# Patient Record
Sex: FEMALE | Race: WHITE | Employment: STUDENT | URBAN - METROPOLITAN AREA
[De-identification: names, ages, dates, MRNs, and addresses within clinical notes are randomized per-mention and may not be internally consistent; named-entity substitution may affect disease eponyms.]

---

## 2017-02-07 ENCOUNTER — OFFICE VISIT (OUTPATIENT)
Dept: FAMILY MEDICINE | Facility: CLINIC | Age: 22
End: 2017-02-07
Payer: COMMERCIAL

## 2017-02-07 VITALS
DIASTOLIC BLOOD PRESSURE: 70 MMHG | WEIGHT: 177 LBS | BODY MASS INDEX: 26.83 KG/M2 | HEIGHT: 68 IN | RESPIRATION RATE: 14 BRPM | TEMPERATURE: 99.2 F | HEART RATE: 69 BPM | SYSTOLIC BLOOD PRESSURE: 110 MMHG | OXYGEN SATURATION: 100 %

## 2017-02-07 DIAGNOSIS — N92.6 IRREGULAR PERIODS: Primary | ICD-10-CM

## 2017-02-07 DIAGNOSIS — R53.83 FATIGUE, UNSPECIFIED TYPE: ICD-10-CM

## 2017-02-07 LAB
BASOPHILS # BLD AUTO: 0 10E9/L (ref 0–0.2)
BASOPHILS NFR BLD AUTO: 0.6 %
DIFFERENTIAL METHOD BLD: NORMAL
EOSINOPHIL # BLD AUTO: 0.2 10E9/L (ref 0–0.7)
EOSINOPHIL NFR BLD AUTO: 3.3 %
ERYTHROCYTE [DISTWIDTH] IN BLOOD BY AUTOMATED COUNT: 13 % (ref 10–15)
HCT VFR BLD AUTO: 38.6 % (ref 35–47)
HGB BLD-MCNC: 13 G/DL (ref 11.7–15.7)
IRON SATN MFR SERPL: 16 % (ref 15–46)
IRON SERPL-MCNC: 61 UG/DL (ref 35–180)
LYMPHOCYTES # BLD AUTO: 2.5 10E9/L (ref 0.8–5.3)
LYMPHOCYTES NFR BLD AUTO: 48.2 %
MCH RBC QN AUTO: 29.7 PG (ref 26.5–33)
MCHC RBC AUTO-ENTMCNC: 33.7 G/DL (ref 31.5–36.5)
MCV RBC AUTO: 88 FL (ref 78–100)
MONOCYTES # BLD AUTO: 0.5 10E9/L (ref 0–1.3)
MONOCYTES NFR BLD AUTO: 8.9 %
NEUTROPHILS # BLD AUTO: 2 10E9/L (ref 1.6–8.3)
NEUTROPHILS NFR BLD AUTO: 39 %
PLATELET # BLD AUTO: 192 10E9/L (ref 150–450)
RBC # BLD AUTO: 4.38 10E12/L (ref 3.8–5.2)
TIBC SERPL-MCNC: 377 UG/DL (ref 240–430)
TSH SERPL DL<=0.005 MIU/L-ACNC: 3.35 MU/L (ref 0.4–4)
WBC # BLD AUTO: 5.2 10E9/L (ref 4–11)

## 2017-02-07 PROCEDURE — 83540 ASSAY OF IRON: CPT | Performed by: PHYSICIAN ASSISTANT

## 2017-02-07 PROCEDURE — 84443 ASSAY THYROID STIM HORMONE: CPT | Performed by: PHYSICIAN ASSISTANT

## 2017-02-07 PROCEDURE — 85027 COMPLETE CBC AUTOMATED: CPT | Performed by: PHYSICIAN ASSISTANT

## 2017-02-07 PROCEDURE — 36415 COLL VENOUS BLD VENIPUNCTURE: CPT | Performed by: PHYSICIAN ASSISTANT

## 2017-02-07 PROCEDURE — 84270 ASSAY OF SEX HORMONE GLOBUL: CPT | Performed by: PHYSICIAN ASSISTANT

## 2017-02-07 PROCEDURE — 84403 ASSAY OF TOTAL TESTOSTERONE: CPT | Performed by: PHYSICIAN ASSISTANT

## 2017-02-07 PROCEDURE — 85004 AUTOMATED DIFF WBC COUNT: CPT | Performed by: PHYSICIAN ASSISTANT

## 2017-02-07 PROCEDURE — 83550 IRON BINDING TEST: CPT | Performed by: PHYSICIAN ASSISTANT

## 2017-02-07 PROCEDURE — 99214 OFFICE O/P EST MOD 30 MIN: CPT | Performed by: PHYSICIAN ASSISTANT

## 2017-02-07 NOTE — Clinical Note
[facility]  [address]  [address]  [facility] [address]  [phone]                                                                                                           Rima Allred  [address]  [facility] [address]    February 10, 2017      Dear Rima,    The results of your recent tests were reviewed and are enclosed.     - Your lab results look great; everything is normal so unfortunately I don't have a cause for your symptoms.  - Your TSH, a screening test for thyroid disease, was normal.  - Your Blood Count Results show normal White Blood Cell count (no sign of infection), normal Hemoglobin (not anemia), and normal Platelets (affects clotting).  - Your iron levels are normal.  - Your testosterone levels are normal so you likely do not have Polycystic Ovarian Syndrome.  - If you would like to start on birth control to see if that helps your symptoms, let me know.    Results for orders placed or performed in visit on 02/07/17   Iron and iron binding capacity   Result Value Ref Range    Iron 61 35 - 180 ug/dL    Iron Binding Cap 377 240 - 430 ug/dL    Iron Saturation Index 16 15 - 46 %   TSH with free T4 reflex   Result Value Ref Range    TSH 3.35 0.40 - 4.00 mU/L   CBC with platelets   Result Value Ref Range    WBC 5.2 4.0 - 11.0 10e9/L    RBC Count 4.38 3.8 - 5.2 10e12/L    Hemoglobin 13.0 11.7 - 15.7 g/dL    Hematocrit 38.6 35.0 - 47.0 %    MCV 88 78 - 100 fl    MCH 29.7 26.5 - 33.0 pg    MCHC 33.7 31.5 - 36.5 g/dL    RDW 13.0 10.0 - 15.0 %    Platelet Count 192 150 - 450 10e9/L   Testosterone Free and Total   Result Value Ref Range    Testosterone Total 32 8 - 60 ng/dL    Sex Hormone Binding Globulin 60 30 - 135 nmol/L    Free Testosterone Calculated 0.38 0.08 - 0.74 ng/dL   Differential   Result Value Ref Range    Diff Method Automated Method     % Neutrophils 39.0 %    % Lymphocytes 48.2 %    % Monocytes 8.9 %    % Eosinophils 3.3 %     % Basophils 0.6 %    Absolute Neutrophil 2.0 1.6 - 8.3 10e9/L    Absolute Lymphocytes 2.5 0.8 - 5.3 10e9/L    Absolute Monocytes 0.5 0.0 - 1.3 10e9/L    Absolute Eosinophils 0.2 0.0 - 0.7 10e9/L    Absolute Basophils 0.0 0.0 - 0.2 10e9/L     Thank you for choosing [facility].  We appreciate the opportunity to serve you and look forward to supporting your healthcare needs in the future.    If you have any questions or concerns, please call me or my staff at [phone].      Sincerely,        Ragini Cunningham PA-C

## 2017-02-07 NOTE — PROGRESS NOTES
SUBJECTIVE:                                                    Rima Allred is a 21 year old female who presents to clinic today for the following health issues:    Concern - other     Onset: About a year     Description:   Patient is here for a few things (general concerns) she states that she has been having irregular periods, feeling more emotional lately, weight gain, bloated, and fatigue.    Intensity: moderate    Progression of Symptoms:  same    Accompanying Signs & Symptoms:  See above   Previous history of similar problem:   n/a    Precipitating factors:   Worsened by: n/a    Alleviating factors:  Improved by: n/a       Therapies Tried and outcome: n/a  Additional complaints: None    HPI additional notes: Rima presents today with   Chief Complaint   Patient presents with     Other     Abnormal Bleeding Problem   Periods have been between 3-7 weeks apart.  Has been lasting for about 4 days but heavier with more painful cramps.      Feels she sleeps okay at night, usually getting about 8 hours.  Weight gain of about 10 pounds since May.  Watches diet and exercises 6 times weekly.      Will occasionally feel dizzy, denies SOB.  Has been having acne breakouts outside of her period.         Wt Readings from Last 5 Encounters:   02/07/17 177 lb (80.287 kg) (94.16 %*)   05/24/16 168 lb 12.8 oz (76.567 kg) (92.56 %*)   08/21/15 179 lb (81.194 kg) (95.36 %*)   08/06/14 172 lb (78.019 kg) (94.60 %*)   08/01/14 178 lb 12.8 oz (81.103 kg) (95.81 %*)     * Growth percentiles are based on CDC 2-20 Years data.         ROS:  C: Negative for fever or chills. Positive for weight gain unintentional  Skin: POSITIVE for acne.  ENT: Negative for ear, mouth and throat problems  Resp: Negative for significant cough or SOB  CV: Negative for chest pain or peripheral edema  GI: Negative for nausea, abdominal pain, heartburn, or change in bowel habits  MS: POSITIVE for generalized fatigue.  NEURO: POSITIVE for  "dizziness/lightheadedness  ENDOCRINE: POSITIVE  for menstrual irregularity and weight gain  PSYCHIATRIC: POSITIVE for mood changes, irritability. Negative for thoughts of self harm or suicide or depressed mood  ROS otherwise negative.    Chart Review:  History   Smoking status     Never Smoker    Smokeless tobacco     Never Used     Patient Active Problem List   Diagnosis     Warts     History reviewed. No pertinent past surgical history.  Problem list, Medication list, Allergies, Medical/Social/Surg hx reviewed in [facility], updated as appropriate.   OBJECTIVE:                                                    /70 mmHg  Pulse 69  Temp(Src) 99.2  F (37.3  C) (Tympanic)  Resp 14  Ht 5' 8.25\" (1.734 m)  Wt 177 lb (80.287 kg)  BMI 26.70 kg/m2  SpO2 100%  LMP 01/24/2017 (Approximate)  Body mass index is 26.7 kg/(m^2).  GENERAL: healthy, alert, in no acute distress  HENT: Mucous mebranes moist.  NECK: Non-tender, no adenopathy. Thyroid normal to inspection and palpation  RESP: lungs clear to auscultation - no rales, no rhonchi, no wheezes  CV: regular rate and rhythm, normal S1 S2. No peripheral edema.  SKIN: no suspicious lesions, no rashes  PSYCH: Alert and oriented times 3;  Able to articulate logical thoughts. Affect is normal.    Diagnostic test results:  Pending     ASSESSMENT/PLAN:                                                      BMI:   Estimated body mass index is 26.7 kg/(m^2) as calculated from the following:    Height as of this encounter: 5' 8.25\" (1.734 m).    Weight as of this encounter: 177 lb (80.287 kg).   Weight management plan: Pt already exercising regularly and watching her diet.        ICD-10-CM    1. Irregular periods N92.6 Iron and iron binding capacity     CBC with platelets     Testosterone Free and Total   2. Fatigue, unspecified type R53.83 TSH with free T4 reflex     Testosterone Free and Total     Will check labs today to rule out anemia, thyroid disorder and PCOS.  Will call " pt with results.  Discussed may want to start on OCP to see if that helps improve her symptoms at least with irregular periods and mood fluctuations.  Pt does not seem depressed.      Please see patient instructions for treatment details.    Follow up as needed.    Ragini Cunningham PA-C  [facility]

## 2017-02-07 NOTE — PATIENT INSTRUCTIONS
Polycystic Ovary Syndrome  Polycystic ovary syndrome (PCOS) causes harmless, small cysts in the ovaries and other symptoms. PCOS is caused by certain hormones being out of balance. The word  syndrome  means a group of symptoms. Women with PCOS may have no periods, irregular periods, or very long periods.    Your ovaries  Women store their eggs in their ovaries. Each egg is in a capsule called a follicle. Normally during the reproductive years, one follicle grows to produce a mature egg each month. This egg is released during ovulation and the follicle dissolves.  Hormones out of balance  With polycystic ovary syndrome (PCOS), the hormones that control ovulation are out of balance. These include estrogen, progesterones, and androgen. As a result, ovulation may not occur. Instead, the follicle stays enlarged. This is a fluid-filled sac (cyst). Over time, the ovaries fill with many small cysts. This is why they are called  poly  (many)  cystic  ovaries. In some women, the ovaries also make too much androgen.  PCOS symptoms  Women with PCOS may also have one or more of these symptoms:    Trouble getting pregnant (fertility problems)    Weight gain, especially around the waist     Acne    Hair growth on the face and other parts of the body    Patches of thickened, velvety, darkened skin called acanthosis nigricans  Women with PCOS are also at increased risk of developing cancer of the uterine lining, diabetes, and heart disease.     7725-4424 [employer]. [address] [place] [address], [place] [address]. All rights reserved. This information is not intended as a substitute for professional medical care. Always follow your healthcare professional's instructions.

## 2017-02-07 NOTE — MR AVS SNAPSHOT
After Visit Summary   2/7/2017    Rima Allred    MRN: [id number]           Patient Information     Date Of Birth          1995        Visit Information        Provider Department      2/7/2017 9:50 AM Ragini Cunningham PA-C [facility]        Today's Diagnoses     Irregular periods    -  1     Fatigue, unspecified type           Care Instructions      Polycystic Ovary Syndrome  Polycystic ovary syndrome (PCOS) causes harmless, small cysts in the ovaries and other symptoms. PCOS is caused by certain hormones being out of balance. The word  syndrome  means a group of symptoms. Women with PCOS may have no periods, irregular periods, or very long periods.    Your ovaries  Women store their eggs in their ovaries. Each egg is in a capsule called a follicle. Normally during the reproductive years, one follicle grows to produce a mature egg each month. This egg is released during ovulation and the follicle dissolves.  Hormones out of balance  With polycystic ovary syndrome (PCOS), the hormones that control ovulation are out of balance. These include estrogen, progesterones, and androgen. As a result, ovulation may not occur. Instead, the follicle stays enlarged. This is a fluid-filled sac (cyst). Over time, the ovaries fill with many small cysts. This is why they are called  poly  (many)  cystic  ovaries. In some women, the ovaries also make too much androgen.  PCOS symptoms  Women with PCOS may also have one or more of these symptoms:    Trouble getting pregnant (fertility problems)    Weight gain, especially around the waist     Acne    Hair growth on the face and other parts of the body    Patches of thickened, velvety, darkened skin called acanthosis nigricans  Women with PCOS are also at increased risk of developing cancer of the uterine lining, diabetes, and heart disease.     4581-7612 [employer] [address] [place] [address], [place]  "[address]. All rights reserved. This information is not intended as a substitute for professional medical care. Always follow your healthcare professional's instructions.              Follow-ups after your visit        Who to contact     If you have questions or need follow up information about today's clinic visit or your schedule please contact [facility] directly at [phone].  Normal or non-critical lab and imaging results will be communicated to you by MyChart, letter or phone within 4 business days after the clinic has received the results. If you do not hear from us within 7 days, please contact the clinic through [employer] or phone. If you have a critical or abnormal lab result, we will notify you by phone as soon as possible.  Submit refill requests through [employer] or call your pharmacy and they will forward the refill request to us. Please allow 3 business days for your refill to be completed.          Additional Information About Your Visit        [employer] Information     [employer] lets you send messages to your doctor, view your test results, renew your prescriptions, schedule appointments and more. To sign up, go to [email or web address] . Click on \"Log in\" on the left side of the screen, which will take you to the Welcome page. Then click on \"Sign up Now\" on the right side of the page.     You will be asked to enter the access code listed below, as well as some personal information. Please follow the directions to create your username and password.     Your access code is: [email or web address]  Expires: 2017 11:44 AM     Your access code will  in 90 days. If you need help or a new code, please call your [facility] or [phone].        Care EveryWhere ID     This is your Care EveryWhere ID. This could be used by other organizations to access your [place] medical records  [id number]        Your Vitals Were     Pulse Temperature Respirations    69 99.2  F (37.3  C) " "(Tympanic) 14    Height BMI (Body Mass Index) Pulse Oximetry    5' 8.25\" (1.734 m) 26.70 kg/m2 100%    Last Period          01/24/2017 (Approximate)         Blood Pressure from Last 3 Encounters:   02/07/17 110/70   05/24/16 100/70   08/21/15 114/70    Weight from Last 3 Encounters:   02/07/17 177 lb (80.287 kg) (94.16 %*)   05/24/16 168 lb 12.8 oz (76.567 kg) (92.56 %*)   08/21/15 179 lb (81.194 kg) (95.36 %*)     * Growth percentiles are based on CDC 2-20 Years data.              We Performed the Following     CBC with platelets     Iron and iron binding capacity     Testosterone Free and Total     TSH with free T4 reflex          Today's Medication Changes          These changes are accurate as of: 2/7/17 11:44 AM.  If you have any questions, ask your nurse or doctor.               Stop taking these medicines if you haven't already. Please contact your care team if you have questions.     phenazopyridine 100 MG tablet   Commonly known as:  PYRIDIUM   Stopped by:  Ragini Cunningham PA-C           sulfamethoxazole-trimethoprim 800-160 MG per tablet   Commonly known as:  BACTRIM DS/SEPTRA DS   Stopped by:  Ragini Cunningham PA-C                    Primary Care Provider Office Phone # Fax #    Ragini Cunningham PA-C [phone]       [facility] [address] [place] [address]  [facility] [address]        Thank you!     Thank you for choosing [facility]  for your care. Our goal is always to provide you with excellent care. Hearing back from our patients is one way we can continue to improve our services. Please take a few minutes to complete the written survey that you may receive in the mail after your visit with us. Thank you!             Your Updated Medication List - Protect others around you: Learn how to safely use, store and throw away your medicines at www.disposemymeds.org.          This list is accurate as of: 2/7/17 " 11:44 AM.  Always use your most recent med list.                   Brand Name Dispense Instructions for use    IBUPROFEN PO      Take 200 mg by mouth as needed for moderate pain

## 2017-02-07 NOTE — NURSING NOTE
"Chief Complaint   Patient presents with     Other     Abnormal Bleeding Problem       Initial /70 mmHg  Pulse 69  Temp(Src) 99.2  F (37.3  C) (Tympanic)  Resp 14  Ht 5' 8.25\" (1.734 m)  Wt 177 lb (80.287 kg)  BMI 26.70 kg/m2  SpO2 100%  LMP 01/24/2017 (Approximate) Estimated body mass index is 26.7 kg/(m^2) as calculated from the following:    Height as of this encounter: 5' 8.25\" (1.734 m).    Weight as of this encounter: 177 lb (80.287 kg).  Medication Reconciliation: complete    "

## 2017-02-09 LAB
SHBG SERPL-SCNC: 60 NMOL/L (ref 30–135)
TESTOST FREE SERPL-MCNC: 0.38 NG/DL (ref 0.08–0.74)
TESTOST SERPL-MCNC: 32 NG/DL (ref 8–60)

## 2017-06-24 ENCOUNTER — HEALTH MAINTENANCE LETTER (OUTPATIENT)
Age: 22
End: 2017-06-24

## 2017-12-28 ENCOUNTER — OFFICE VISIT (OUTPATIENT)
Dept: FAMILY MEDICINE | Facility: CLINIC | Age: 22
End: 2017-12-28
Payer: COMMERCIAL

## 2017-12-28 VITALS
SYSTOLIC BLOOD PRESSURE: 112 MMHG | RESPIRATION RATE: 14 BRPM | OXYGEN SATURATION: 98 % | BODY MASS INDEX: 25.66 KG/M2 | DIASTOLIC BLOOD PRESSURE: 74 MMHG | HEART RATE: 78 BPM | WEIGHT: 170 LBS | TEMPERATURE: 97 F

## 2017-12-28 DIAGNOSIS — N76.0 VAGINITIS AND VULVOVAGINITIS: Primary | ICD-10-CM

## 2017-12-28 DIAGNOSIS — B37.31 YEAST INFECTION OF THE VAGINA: ICD-10-CM

## 2017-12-28 DIAGNOSIS — L21.9 SEBORRHEIC DERMATITIS: ICD-10-CM

## 2017-12-28 DIAGNOSIS — N94.6 DYSMENORRHEA: ICD-10-CM

## 2017-12-28 LAB
SPECIMEN SOURCE: ABNORMAL
WET PREP SPEC: ABNORMAL

## 2017-12-28 PROCEDURE — 87210 SMEAR WET MOUNT SALINE/INK: CPT | Performed by: PHYSICIAN ASSISTANT

## 2017-12-28 PROCEDURE — 99214 OFFICE O/P EST MOD 30 MIN: CPT | Performed by: PHYSICIAN ASSISTANT

## 2017-12-28 RX ORDER — KETOCONAZOLE 20 MG/ML
SHAMPOO TOPICAL
Qty: 120 ML | Refills: 3 | Status: SHIPPED | OUTPATIENT
Start: 2017-12-28 | End: 2019-11-25

## 2017-12-28 RX ORDER — CLOBETASOL PROPIONATE 0.05 G/100ML
SHAMPOO TOPICAL
Qty: 1 BOTTLE | Refills: 3 | Status: CANCELLED | OUTPATIENT
Start: 2017-12-28

## 2017-12-28 RX ORDER — FLUCONAZOLE 150 MG/1
150 TABLET ORAL ONCE
Qty: 3 TABLET | Refills: 3 | Status: SHIPPED | OUTPATIENT
Start: 2017-12-28 | End: 2017-12-28

## 2017-12-28 RX ORDER — DESOGESTREL AND ETHINYL ESTRADIOL 0.15-0.03
1 KIT ORAL DAILY
Qty: 84 TABLET | Refills: 3 | Status: SHIPPED | OUTPATIENT
Start: 2017-12-28 | End: 2018-11-29

## 2017-12-28 NOTE — PROGRESS NOTES
SUBJECTIVE:   Rima Allred is a 22 year old female who presents to clinic today for the following health issues:    Vaginal Symptoms  Onset: ongoing more than a few weeks    Description:  Vaginal Discharge: curd-like   Itching (Pruritis): no   Burning sensation:  no   Odor: YES    Accompanying Signs & Symptoms:  Pain with Urination: no   Abdominal Pain: no but states that she has been having really bad period cramps  Fever: no     History:   Sexually active: YES  New Partner: no   Possibility of Pregnancy:  No    Precipitating factors:   Recent Antibiotic Use: no     Alleviating factors:  n/a    Therapies Tried and outcome: no      -pt would also like to discuss options for dandruff, and has tried OTC products to help    Reviewed and updated as needed this visit by clinical staff  Tobacco  Allergies  Meds  Problems  Med Hx  Surg Hx  Fam Hx  Soc Hx        Reviewed and updated as needed this visit by Provider  Tobacco  Allergies  Meds  Problems  Med Hx  Surg Hx  Fam Hx  Soc Hx        Additional complaints: Worsening pain with cramps    HPI additional notes: Rima presents today with   Chief Complaint   Patient presents with     Vaginal Problem   Denies hx of liver disease, migraine or thrombophlebitis   Denies fam hx of cancers or thrombophlebitis       ROS:  C: Negative for fever, chills, recent change in weight  Skin: Positive for dandruff   CV: Negative for chest pain or peripheral edema  : POSITIVE for vaginal discharge, vaginal itching/ irritation and dysmenorrhea.   P: Negative for changes in mood or affect  ROS otherwise negative.    Chart Review:  History   Smoking Status     Never Smoker   Smokeless Tobacco     Never Used     Patient Active Problem List   Diagnosis     Warts     Seborrheic dermatitis     Dysmenorrhea     History reviewed. No pertinent surgical history.  Problem list, Medication list, Allergies, Medical/Social/Surg hx reviewed in [facility], updated as appropriate.   OBJECTIVE:                                                     /74  Pulse 78  Temp 97  F (36.1  C) (Tympanic)  Resp 14  Wt 170 lb (77.1 kg)  LMP 12/21/2017  SpO2 98%  BMI 25.66 kg/m2  Body mass index is 25.66 kg/(m^2).  GENERAL: healthy, alert, in no acute distress  HENT: Mucous mebranes moist.  RESP: lungs clear to auscultation - no rales, no rhonchi, no wheezes  CV: regular rate and rhythm, normal S1 S2.  No peripheral edema.  SKIN: white flaking epidermis on scalp, mild erythema.  PSYCH: Alert and oriented times 3;  Able to articulate logical thoughts. Affect is normal.    Diagnostic test results: Wet prep positive for yeast.     ASSESSMENT/PLAN:                                                          ICD-10-CM    1. Vaginitis and vulvovaginitis N76.0 Wet prep   2. Seborrheic dermatitis L21.9 ketoconazole (NIZORAL) 2 % shampoo   3. Yeast infection of the vagina B37.3 fluconazole (DIFLUCAN) 150 MG tablet   4. Dysmenorrhea N94.6 desogestrel-ethinyl estradiol (APRI) 0.15-30 MG-MCG per tablet   Will trial ketoconazole for dandruff.  If not improving, may need to add clobetasol shampoo daily for 2 weeks until symptoms improve.    Will trial apri for dysmenorrhea, should also help somewhat with pt acne.  Discussed IUD but pt does not want to stop having her periods.  Iron IUD not recommended as it would worsen not improve her cramps.    Will give slightly longer prescription for diflucan as pt has had a prolonged yeast infection.    Please see patient instructions for treatment details.    Follow up in 7-10 days if not improving as anticipated.    Ragini Cunningham PA-C  [facility]

## 2017-12-28 NOTE — PATIENT INSTRUCTIONS
Types of Vaginal Infections:  Bacterial vaginosis (BV). BV is due to an imbalance in the normal bacteria in the vagina. Lactobacillus bacteria decrease. As a result, the numbers of bad bacteria increase.  Candidiasis(yeast infection). Yeast is a type of fungus. A yeast infection occurs when yeast cells in the vagina increase. They then attack vaginal tissues. A type of yeast called Candida albicans is often involved.  Trichomoniasis ( trich ). Trich is a parasite. It is passed from one person to another during sex. Men with trich often don t have any symptoms. In women, it can take weeks or months before symptoms appear.  Vaginal Infection: Bacterial Vaginosis  Both good and bad bacteria are present in a healthy vagina. Bacterial vaginosis (BV) occurs when these bacteria get out of balance. The numbers of good bacteria decrease. This allows the numbers of bad bacteria to increase and cause BV. In most cases, BV is not a serious problem. This sheet tells you more about BV and how it can be treated.  Causes of Bacterial Vaginosis  The cause of BV is not clear. Douching may lead to it. Having sex with a new partner or more than one partner makes it more likely.  Symptoms of Bacterial Vaginosis  Symptoms of BV vary for each woman. Some women have few symptoms or none at all. If symptoms are present, they can include:    Thin, milky white or gray discharge    Unpleasant  fishy  odor    Irritation, itching, and burning at opening of vagina    Burning or irritation with sex or urination   Diagnosing Bacterial Vaginosis  Your healthcare provider will ask about your symptoms and health history. He or she will also perform a pelvic exam. This is an exam of your vagina and cervix. A sample of vaginal fluid or discharge may be taken. This sample is checked for signs of BV.  Treating Bacterial Vaginosis  BV is often treated with antibiotics. They may be given in oral pill form or as a vaginal cream. To use these  medications:    Be sure to take all of your medication, even if your symptoms go away.    If you re taking antibiotic pills, do not drink alcohol until you re finished with all of your medication.    If you re using vaginal cream, apply it as directed. Be aware that the cream may make condoms and diaphragms less effective.    Call your healthcare provider if symptoms do not go away within 4 days of starting treatment. Also call if you have a reaction to the medication.   Why Treatment Matters  Even if you have no symptoms or your symptoms are mild, BV should be treated. Untreated BV can lead to health problems such as:    Increased risk of  delivery if you re pregnant.    Increased risk of complications after surgery on the reproductive organs.    Possible increased risk of pelvic inflammatory disease (PID).   Preventing Vaginal Infection  These steps can help you stay comfortable during treatment of a vaginal infection. They also help prevent vaginal infections in the future.  Keeping a Healthy Balance  Factors that change the normal balance in the vagina calead to a vaginal infection. To help keep the balance normal, try these tips:  Change out of wet bathing suits and damp exercise clothes as soon as possible. Yeast thrive in a warm, moist environment.  Avoid wearing tight pants. Choose cotton underwear and pantyhose that have a cotton crotch. Cotton keeps you cooler and drier than synthetics.  Don't douche unless directed by your health care provider. Douching can destroy friendly bacteria and change the vagina's normal balance.  Wipe from front to back after using the toilet. This prevents bacteria from spreading from the anus to the vulva.  Wash the vulva with mild, unscented soap or with plain water.  Wash your diaphragm, spermicide applicators, and sex toys with mild soap and water after use. Dry them thoroughly before putting them away.  Change tampons often (every 2 hours to 4 hours). Leaving a  tampon in for too long may disrupt the balance of vaginal bacteria.  Staying Healthy Overall  Good overall health can help you resist infection. To be healthier:  Help protect yourself from STDs by using latex condoms for intercourse. Ask your health care provider for more information about safer sex.  Eat a variety of healthy foods.  Exercise regularly. Maintain a healthy weight. If you need to lose weight, ask your health care provider for advice on how to start.  Get enough rest and sleep.    How to use your oral contraceptive pills:    Start on Sunday after next normal menstrual period, continue the pills daily through the month and you should have menses on the fourth week.  You should take the pills at around the same time every day, take a missed pill as soon as you remember.  Any missed pills could cause spotting, cramping or pregnancy if sexually active.  While these medications usually make the period lighter and decrease cramping, there is a possibility that you could have heavier or irregular bleeding.  If that is not improving over the first 3 months we can switch to another contraception.  The pill should not be taken if there is a strong family history of blood clots or breast cancer.  You should avoid smoking while on the pill.  Please plan to use condoms to protect against sexually transmitted diseases if you are sexually active.

## 2017-12-28 NOTE — MR AVS SNAPSHOT
After Visit Summary   12/28/2017    Rima Allred    MRN: [id number]           Patient Information     Date Of Birth          1995        Visit Information        Provider Department      12/28/2017 10:50 AM Ragini Cunningham PA-C [facility]        Today's Diagnoses     Vaginitis and vulvovaginitis    -  1    Seborrheic dermatitis        Yeast infection of the vagina        Dysmenorrhea          Care Instructions    Types of Vaginal Infections:  Bacterial vaginosis (BV). BV is due to an imbalance in the normal bacteria in the vagina. Lactobacillus bacteria decrease. As a result, the numbers of bad bacteria increase.  Candidiasis(yeast infection). Yeast is a type of fungus. A yeast infection occurs when yeast cells in the vagina increase. They then attack vaginal tissues. A type of yeast called Candida albicans is often involved.  Trichomoniasis ( trich ). Trich is a parasite. It is passed from one person to another during sex. Men with trich often don t have any symptoms. In women, it can take weeks or months before symptoms appear.  Vaginal Infection: Bacterial Vaginosis  Both good and bad bacteria are present in a healthy vagina. Bacterial vaginosis (BV) occurs when these bacteria get out of balance. The numbers of good bacteria decrease. This allows the numbers of bad bacteria to increase and cause BV. In most cases, BV is not a serious problem. This sheet tells you more about BV and how it can be treated.  Causes of Bacterial Vaginosis  The cause of BV is not clear. Douching may lead to it. Having sex with a new partner or more than one partner makes it more likely.  Symptoms of Bacterial Vaginosis  Symptoms of BV vary for each woman. Some women have few symptoms or none at all. If symptoms are present, they can include:    Thin, milky white or gray discharge    Unpleasant  fishy  odor    Irritation, itching, and burning at opening of  vagina    Burning or irritation with sex or urination   Diagnosing Bacterial Vaginosis  Your healthcare provider will ask about your symptoms and health history. He or she will also perform a pelvic exam. This is an exam of your vagina and cervix. A sample of vaginal fluid or discharge may be taken. This sample is checked for signs of BV.  Treating Bacterial Vaginosis  BV is often treated with antibiotics. They may be given in oral pill form or as a vaginal cream. To use these medications:    Be sure to take all of your medication, even if your symptoms go away.    If you re taking antibiotic pills, do not drink alcohol until you re finished with all of your medication.    If you re using vaginal cream, apply it as directed. Be aware that the cream may make condoms and diaphragms less effective.    Call your healthcare provider if symptoms do not go away within 4 days of starting treatment. Also call if you have a reaction to the medication.   Why Treatment Matters  Even if you have no symptoms or your symptoms are mild, BV should be treated. Untreated BV can lead to health problems such as:    Increased risk of  delivery if you re pregnant.    Increased risk of complications after surgery on the reproductive organs.    Possible increased risk of pelvic inflammatory disease (PID).   Preventing Vaginal Infection  These steps can help you stay comfortable during treatment of a vaginal infection. They also help prevent vaginal infections in the future.  Keeping a Healthy Balance  Factors that change the normal balance in the vagina calead to a vaginal infection. To help keep the balance normal, try these tips:  Change out of wet bathing suits and damp exercise clothes as soon as possible. Yeast thrive in a warm, moist environment.  Avoid wearing tight pants. Choose cotton underwear and pantyhose that have a cotton crotch. Cotton keeps you cooler and drier than synthetics.  Don't douche unless directed by your  health care provider. Douching can destroy friendly bacteria and change the vagina's normal balance.  Wipe from front to back after using the toilet. This prevents bacteria from spreading from the anus to the vulva.  Wash the vulva with mild, unscented soap or with plain water.  Wash your diaphragm, spermicide applicators, and sex toys with mild soap and water after use. Dry them thoroughly before putting them away.  Change tampons often (every 2 hours to 4 hours). Leaving a tampon in for too long may disrupt the balance of vaginal bacteria.  Staying Healthy Overall  Good overall health can help you resist infection. To be healthier:  Help protect yourself from STDs by using latex condoms for intercourse. Ask your health care provider for more information about safer sex.  Eat a variety of healthy foods.  Exercise regularly. Maintain a healthy weight. If you need to lose weight, ask your health care provider for advice on how to start.  Get enough rest and sleep.    How to use your oral contraceptive pills:    Start on Sunday after next normal menstrual period, continue the pills daily through the month and you should have menses on the fourth week.  You should take the pills at around the same time every day, take a missed pill as soon as you remember.  Any missed pills could cause spotting, cramping or pregnancy if sexually active.  While these medications usually make the period lighter and decrease cramping, there is a possibility that you could have heavier or irregular bleeding.  If that is not improving over the first 3 months we can switch to another contraception.  The pill should not be taken if there is a strong family history of blood clots or breast cancer.  You should avoid smoking while on the pill.  Please plan to use condoms to protect against sexually transmitted diseases if you are sexually active.              Follow-ups after your visit        Who to contact     If you have questions or need  "follow up information about today's clinic visit or your schedule please contact [facility] directly at [phone].  Normal or non-critical lab and imaging results will be communicated to you by MyChart, letter or phone within 4 business days after the clinic has received the results. If you do not hear from us within 7 days, please contact the clinic through [facility] or phone. If you have a critical or abnormal lab result, we will notify you by phone as soon as possible.  Submit refill requests through [facility] [employer] or call your pharmacy and they will forward the refill request to us. Please allow 3 business days for your refill to be completed.          Additional Information About Your Visit        [facility] Information     [facility] lets you send messages to your doctor, view your test results, renew your prescriptions, schedule appointments and more. To sign up, go to [email or web address] . Click on \"Log in\" on the left side of the screen, which will take you to the Welcome page. Then click on \"Sign up Now\" on the right side of the page.     You will be asked to enter the access code listed below, as well as some personal information. Please follow the directions to create your username and password.     Your access code is: [email or web address]  Expires: 3/28/2018 11:56 AM     Your access code will  in 90 days. If you need help or a new code, please call your [place] clinic or [phone].        Care EveryWhere ID     This is your Care EveryWhere ID. This could be used by other organizations to access your [place] medical records  [id number]        Your Vitals Were     Pulse Temperature Respirations Last Period Pulse Oximetry BMI (Body Mass Index)    78 97  F (36.1  C) (Tympanic) 14 2017 98% 25.66 kg/m2       Blood Pressure from Last 3 Encounters:   17 112/74   17 110/70   16 100/70    Weight from Last 3 Encounters:   17 170 lb (77.1 kg)   17 177 " lb (80.3 kg) (94 %)*   05/24/16 168 lb 12.8 oz (76.6 kg) (93 %)*     * Growth percentiles are based on CDC 2-20 Years data.              We Performed the Following     Wet prep          Today's Medication Changes          These changes are accurate as of: 12/28/17 11:56 AM.  If you have any questions, ask your nurse or doctor.               Start taking these medicines.        Dose/Directions    desogestrel-ethinyl estradiol 0.15-30 MG-MCG per tablet   Commonly known as:  APRI   Used for:  Dysmenorrhea   Started by:  Ragini Cunningham PA-C        Dose:  1 tablet   Take 1 tablet by mouth daily   Quantity:  84 tablet   Refills:  3       fluconazole 150 MG tablet   Commonly known as:  DIFLUCAN   Used for:  Yeast infection of the vagina   Started by:  Ragini Cunningham PA-C        Dose:  150 mg   Take 1 tablet (150 mg) by mouth once for 1 dose and repeat as needed every three days.   Quantity:  3 tablet   Refills:  3       ketoconazole 2 % shampoo   Commonly known as:  NIZORAL   Used for:  Seborrheic dermatitis   Started by:  Ragini Cunningham PA-C        Apply to damp skin, lather, leave on 5 minutes, and rinse. Use 3-4 times weekly until symptoms improve.   Quantity:  120 mL   Refills:  3            Where to get your medicines      These medications were sent to [facility] [address] [facility] [address] AT [facility] [address]  [address], [facility] [address]    Hours:  24-hours Phone:  [phone]     desogestrel-ethinyl estradiol 0.15-30 MG-MCG per tablet    fluconazole 150 MG tablet    ketoconazole 2 % shampoo                Primary Care Provider Office Phone # Fax #    Ragini Cunningham PA-C [phone] [address]  [facility] [address]        Equal Access to Services     JIMY BEAVER AH: Hadii iqra Meeks, sinanda annie, qaybta kaalmada ulices stevens  laingrid henley. So [facility] [phone].    ATENCIÓN: Si habla nighat, tiene a lema disposición servicios gratuitos de asistencia lingüística. [facility] camp [phone].    We comply with applicable federal civil rights laws and [place] laws. We do not discriminate on the basis of race, color, national origin, age, disability, sex, sexual orientation, or gender identity.            Thank you!     Thank you for choosing [facility]  for your care. Our goal is always to provide you with excellent care. Hearing back from our patients is one way we can continue to improve our services. Please take a few minutes to complete the written survey that you may receive in the mail after your visit with us. Thank you!             Your Updated Medication List - Protect others around you: Learn how to safely use, store and throw away your medicines at www.disposemymeds.org.          This list is accurate as of: 12/28/17 11:56 AM.  Always use your most recent med list.                   Brand Name Dispense Instructions for use Diagnosis    desogestrel-ethinyl estradiol 0.15-30 MG-MCG per tablet    APRI    84 tablet    Take 1 tablet by mouth daily    Dysmenorrhea       fluconazole 150 MG tablet    DIFLUCAN    3 tablet    Take 1 tablet (150 mg) by mouth once for 1 dose and repeat as needed every three days.    Yeast infection of the vagina       IBUPROFEN PO      Take 200 mg by mouth as needed for moderate pain        ketoconazole 2 % shampoo    NIZORAL    120 mL    Apply to damp skin, lather, leave on 5 minutes, and rinse. Use 3-4 times weekly until symptoms improve.    Seborrheic dermatitis

## 2018-06-27 ENCOUNTER — TELEPHONE (OUTPATIENT)
Dept: FAMILY MEDICINE | Facility: CLINIC | Age: 23
End: 2018-06-27

## 2018-06-27 NOTE — TELEPHONE ENCOUNTER
Panel Management Review      Patient has the following on her problem list: None      Composite cancer screening  Chart review shows that this patient is due/due soon for the following Chlamydia screening  Summary:    Patient is due/failing the following:   Chlamydia screening and PHYSICAL    Action needed:   Patient needs office visit for physical.    Type of outreach:    Sent letter.    Questions for provider review:    None

## 2018-06-27 NOTE — LETTER
June 27, 2018    Rima Allred  [address] [facility] [address]    Dear Angel Abarca cares about your health and your health plan.  I have reviewed your medical conditions, medication list and lab results, and am making recommendations based on this review to better manage your health.    You are in particular need of attention regarding:  -Wellness (Physical) Visit   -GC Chlamydia Screening    I am recommending that you:     -schedule a WELLNESS (Physical) APPOINTMENT with me.       -Be tested for Chlamydia      Annual testing is recommended for sexually active women between the ages of 15 and 25.     Chlamydia is the most common bacterial sexually transmitted disease in the [place], according to the Centers for Disease Control (CDC), yet many women considered at risk for the disease do not get the recommended annual screening test.     Chlamydia has no symptoms and left untreated, it can cause infertility and other serious health problems. Chlamydia is easily cured with antibiotics.  We have enclosed a brochure that gives you additional information about Chlamydia.    Testing is now usually done by leaving a urine sample with a lab-only appointment or you can make an office visit if you have other concerns. (If you are not recently or currently sexually active, then please contact us so we can update your medical record.)          Please call us at the [place] location:  [phone] or use [facility] [place] to address the above recommendations.     Thank you for trusting [facility].  We appreciate the opportunity to serve you and look forward to supporting your healthcare in the future.    If you have (or plan to have) any of these tests done at a facility other than a [facility] or a [facility], please have the results sent to the [facility] location noted above.      Best Regards,    JACOB Ellis

## 2018-09-17 ENCOUNTER — OFFICE VISIT (OUTPATIENT)
Dept: FAMILY MEDICINE | Facility: CLINIC | Age: 23
End: 2018-09-17
Payer: COMMERCIAL

## 2018-09-17 VITALS
OXYGEN SATURATION: 100 % | DIASTOLIC BLOOD PRESSURE: 64 MMHG | HEART RATE: 96 BPM | BODY MASS INDEX: 24 KG/M2 | WEIGHT: 159 LBS | SYSTOLIC BLOOD PRESSURE: 122 MMHG | RESPIRATION RATE: 14 BRPM | TEMPERATURE: 98.1 F

## 2018-09-17 DIAGNOSIS — R82.90 NONSPECIFIC FINDING ON EXAMINATION OF URINE: ICD-10-CM

## 2018-09-17 DIAGNOSIS — N30.01 ACUTE CYSTITIS WITH HEMATURIA: Primary | ICD-10-CM

## 2018-09-17 DIAGNOSIS — Z11.3 SCREENING EXAMINATION FOR VENEREAL DISEASE: ICD-10-CM

## 2018-09-17 DIAGNOSIS — R30.0 DYSURIA: ICD-10-CM

## 2018-09-17 LAB
ALBUMIN UR-MCNC: NEGATIVE MG/DL
APPEARANCE UR: ABNORMAL
BACTERIA #/AREA URNS HPF: ABNORMAL /HPF
BILIRUB UR QL STRIP: NEGATIVE
COLOR UR AUTO: YELLOW
GLUCOSE UR STRIP-MCNC: NEGATIVE MG/DL
HGB UR QL STRIP: ABNORMAL
KETONES UR STRIP-MCNC: NEGATIVE MG/DL
LEUKOCYTE ESTERASE UR QL STRIP: ABNORMAL
NITRATE UR QL: NEGATIVE
PH UR STRIP: 7 PH (ref 5–7)
RBC #/AREA URNS AUTO: ABNORMAL /HPF
SOURCE: ABNORMAL
SP GR UR STRIP: 1.01 (ref 1–1.03)
UROBILINOGEN UR STRIP-ACNC: 0.2 EU/DL (ref 0.2–1)
WBC #/AREA URNS AUTO: ABNORMAL /HPF

## 2018-09-17 PROCEDURE — 87186 SC STD MICRODIL/AGAR DIL: CPT | Performed by: PHYSICIAN ASSISTANT

## 2018-09-17 PROCEDURE — 81001 URINALYSIS AUTO W/SCOPE: CPT | Performed by: PHYSICIAN ASSISTANT

## 2018-09-17 PROCEDURE — 87491 CHLMYD TRACH DNA AMP PROBE: CPT | Performed by: PHYSICIAN ASSISTANT

## 2018-09-17 PROCEDURE — 87088 URINE BACTERIA CULTURE: CPT | Performed by: PHYSICIAN ASSISTANT

## 2018-09-17 PROCEDURE — 99213 OFFICE O/P EST LOW 20 MIN: CPT | Performed by: PHYSICIAN ASSISTANT

## 2018-09-17 PROCEDURE — 87086 URINE CULTURE/COLONY COUNT: CPT | Performed by: PHYSICIAN ASSISTANT

## 2018-09-17 RX ORDER — PHENAZOPYRIDINE HYDROCHLORIDE 200 MG/1
200 TABLET, FILM COATED ORAL 3 TIMES DAILY PRN
Qty: 6 TABLET | Refills: 0 | Status: SHIPPED | OUTPATIENT
Start: 2018-09-17 | End: 2018-09-19

## 2018-09-17 RX ORDER — SULFAMETHOXAZOLE/TRIMETHOPRIM 800-160 MG
1 TABLET ORAL 2 TIMES DAILY
Qty: 6 TABLET | Refills: 0 | Status: SHIPPED | OUTPATIENT
Start: 2018-09-17 | End: 2018-09-20

## 2018-09-17 NOTE — PROGRESS NOTES
SUBJECTIVE:   Rima Allred is a 22 year old female who presents to clinic today for the following health issues:    URINARY TRACT SYMPTOMS  Onset: today    Description:   Painful urination (Dysuria): YES- burning  Blood in urine (Hematuria): no   Delay in urine (Hesitency): no     Intensity: mild, moderate    Progression of Symptoms:  worsening    Accompanying Signs & Symptoms:  Fever/chills: no   Flank pain no   Nausea and vomiting: no   Any vaginal symptoms: none  Abdominal/Pelvic Pain: no     History:   History of frequent UTI's: no   History of kidney stones: no   Sexually Active: YES  Possibility of pregnancy: No    Precipitating factors:   n/a    Therapies Tried and outcome: nothing  Reviewed and updated as needed this visit by clinical staff  Tobacco  Allergies  Meds  Problems  Med Hx  Surg Hx  Fam Hx  Soc Hx        Reviewed and updated as needed this visit by Provider  Tobacco  Allergies  Meds  Problems  Med Hx  Surg Hx  Fam Hx  Soc Hx        Additional complaints: None    HPI additional notes: Rima presents today with   Chief Complaint   Patient presents with     UTI          ROS:  C: Negative for fever, chills, recent change in weight  CV: Negative for chest pain or peripheral edema  : POSITIVE for frequency, urgency, and dysuria.  MS: Negative for flank pain  P: Negative for changes in mood or affect  ROS otherwise negative.    Chart Review:  History   Smoking Status     Never Smoker   Smokeless Tobacco     Never Used     No flowsheet data found.  No flowsheet data found.    Patient Active Problem List   Diagnosis     Warts     Seborrheic dermatitis     Dysmenorrhea     History reviewed. No pertinent surgical history.  Problem list, Medication list, Allergies, Medical/Social/Surg hx reviewed in EPIC, updated as appropriate.   OBJECTIVE:                                                    /64  Pulse 96  Temp 98.1  F (36.7  C) (Tympanic)  Resp 14  Wt 159 lb (72.1 kg)  LMP  09/13/2018 (Approximate)  SpO2 100%  BMI 24 kg/m2  Body mass index is 24 kg/(m^2).  GENERAL: healthy, alert, in no acute distress  HENT: Mucous mebranes moist.  MS: no gross deformities noted.  No CVA tenderness.  SKIN: no suspicious lesions, no rashes  PSYCH: Alert and oriented times 3;  Able to articulate logical thoughts. Affect is normal.    Diagnostic test results:   Results for orders placed or performed in visit on 09/17/18 (from the past 24 hour(s))   UA reflex to Microscopic and Culture   Result Value Ref Range    Color Urine Yellow     Appearance Urine Slightly Cloudy     Glucose Urine Negative NEG^Negative mg/dL    Bilirubin Urine Negative NEG^Negative    Ketones Urine Negative NEG^Negative mg/dL    Specific Gravity Urine 1.010 1.003 - 1.035    Blood Urine Large (A) NEG^Negative    pH Urine 7.0 5.0 - 7.0 pH    Protein Albumin Urine Negative NEG^Negative mg/dL    Urobilinogen Urine 0.2 0.2 - 1.0 EU/dL    Nitrite Urine Negative NEG^Negative    Leukocyte Esterase Urine Large (A) NEG^Negative    Source Midstream Urine    Urine Microscopic   Result Value Ref Range    WBC Urine 10-25 (A) OTO5^0 - 5 /HPF    RBC Urine 2-5 (A) OTO2^O - 2 /HPF    Bacteria Urine Few (A) NEG^Negative /HPF        ASSESSMENT/PLAN:                                                          ICD-10-CM    1. Acute cystitis with hematuria N30.01 sulfamethoxazole-trimethoprim (BACTRIM DS) 800-160 MG per tablet     phenazopyridine (PYRIDIUM) 200 MG tablet   2. Dysuria R30.0 UA reflex to Microscopic and Culture     Chlamydia trachomatis PCR     Urine Microscopic   3. Screening examination for venereal disease Z11.3      Will call if urine culture recommends antibiotic change.     Please see patient instructions for treatment details.    Return in about 2 days (around 9/19/2018), or if symptoms worsen or fail to improve.    Ragini Cunningham PA-C  [facility]

## 2018-09-17 NOTE — MR AVS SNAPSHOT
After Visit Summary   9/17/2018    Rima Allred    MRN: [id number]           Patient Information     Date Of Birth          1995        Visit Information        Provider Department      9/17/2018 3:50 PM Ragini Cunningham PA-C [facility]        Today's Diagnoses     Acute cystitis with hematuria    -  1    Dysuria        Screening examination for venereal disease          Care Instructions    1. Start antibiotic today.  2. Increase fluids to help flush urinary tract.    3. Take ibuprofen (600mg every 6 hours with food or water) or tylenol (500mg every 6 hours) for pain.   4. OTC urinary pain reliever Azo/Uristat can be used to prevent discomfort. It changes the color of the urine (usually to orange or red) and may stain fabric. Do not use for longer than 48 hours since symptoms should have cleared by this time once antibiotics have been started.  If you are still having severe symptoms, you need to follow up with the clinic.  5. When culture results return, we will call and change your medication if needed.  Follow up immediately if you start to have high fever, severe back pain, nausea or vomiting.  To prevent future infections:  Drink plenty of water.   Do not delay urinating when you feel the need to urinate.   Keep the vaginal area clean. Wipe from front to back after using the toilet. Be sure to gently wash the genital area each time you bathe or shower. However, use soap only on the outside of your vagina. The chemicals in soap may cause additional irritation.   Urinate after intercourse. Never combine anal and vaginal intercourse.   Wear cotton underwear, which allows better air circulation than nylon. Wear pantyhose with cotton crotches.   Avoid tight clothes in the genital area, such as control-top pantyhose and tight jeans. Do not wear a wet bathing suit for long periods of time.               Follow-ups after your visit        Follow-up  notes from your care team     Return in about 2 days (around 9/19/2018), or if symptoms worsen or fail to improve.      Who to contact     If you have questions or need follow up information about today's clinic visit or your schedule please contact [facility] directly at [phone].  Normal or non-critical lab and imaging results will be communicated to you by MyChart, letter or phone within 4 business days after the clinic has received the results. If you do not hear from us within 7 days, please contact the clinic through MyChart or phone. If you have a critical or abnormal lab result, we will notify you by phone as soon as possible.  Submit refill requests through [employer] or call your pharmacy and they will forward the refill request to us. Please allow 3 business days for your refill to be completed.          Additional Information About Your Visit        Care EveryWhere ID     This is your Care EveryWhere ID. This could be used by other organizations to access your [place] medical records  [id number]        Your Vitals Were     Pulse Temperature Respirations Last Period Pulse Oximetry BMI (Body Mass Index)    96 98.1  F (36.7  C) (Tympanic) 14 09/13/2018 (Approximate) 100% 24 kg/m2       Blood Pressure from Last 3 Encounters:   09/17/18 122/64   12/28/17 112/74   02/07/17 110/70    Weight from Last 3 Encounters:   09/17/18 159 lb (72.1 kg)   12/28/17 170 lb (77.1 kg)   02/07/17 177 lb (80.3 kg) (94 %)*     * Growth percentiles are based on CDC 2-20 Years data.              We Performed the Following     Chlamydia trachomatis PCR     UA reflex to Microscopic and Culture     Urine Microscopic          Today's Medication Changes          These changes are accurate as of 9/17/18  4:13 PM.  If you have any questions, ask your nurse or doctor.               Start taking these medicines.        Dose/Directions    phenazopyridine 200 MG tablet   Commonly known as:  PYRIDIUM   Used  for:  Acute cystitis with hematuria   Started by:  Ragini Cunningham PA-C        Dose:  200 mg   Take 1 tablet (200 mg) by mouth 3 times daily as needed for pain   Quantity:  6 tablet   Refills:  0       sulfamethoxazole-trimethoprim 800-160 MG per tablet   Commonly known as:  BACTRIM DS   Used for:  Acute cystitis with hematuria   Started by:  Ragini Cunningham PA-C        Dose:  1 tablet   Take 1 tablet by mouth 2 times daily for 3 days   Quantity:  6 tablet   Refills:  0            Where to get your medicines      These medications were sent to [facility] [address] AT [facility] [address]  [address], [facility] [address]     Phone:  [phone]     phenazopyridine 200 MG tablet    sulfamethoxazole-trimethoprim 800-160 MG per tablet                Primary Care Provider Office Phone # Fax #    Ragini Cunningham PA-C [phone] [address] [facility] [address]  [facility] [address]        Equal Access to Services     CALIN BEAVER AH: Hadii aad ku hadasho Soomaali, waaxda luqadaha, qaybta kaalmada adeegyada, ulices idiin hayaan adematias flores . So [facility] [phone].    ATENCIÓN: Si habla español, tiene a lema disposición servicios gratuitos de asistencia lingüística. [facility] [phone].    We comply with applicable federal civil rights laws and [place] laws. We do not discriminate on the basis of race, color, national origin, age, disability, sex, sexual orientation, or gender identity.            Thank you!     Thank you for choosing [facility]  for your care. Our goal is always to provide you with excellent care. Hearing back from our patients is one way we can continue to improve our services. Please take a few minutes to complete the written survey that you may receive in the mail after your visit with us. Thank you!             Your Updated Medication List - Protect  others around you: Learn how to safely use, store and throw away your medicines at www.disposemymeds.org.          This list is accurate as of 9/17/18  4:13 PM.  Always use your most recent med list.                   Brand Name Dispense Instructions for use Diagnosis    desogestrel-ethinyl estradiol 0.15-30 MG-MCG per tablet    APRI    84 tablet    Take 1 tablet by mouth daily    Dysmenorrhea       IBUPROFEN PO      Take 200 mg by mouth as needed for moderate pain        ketoconazole 2 % shampoo    NIZORAL    120 mL    Apply to damp skin, lather, leave on 5 minutes, and rinse. Use 3-4 times weekly until symptoms improve.    Seborrheic dermatitis       phenazopyridine 200 MG tablet    PYRIDIUM    6 tablet    Take 1 tablet (200 mg) by mouth 3 times daily as needed for pain    Acute cystitis with hematuria       sulfamethoxazole-trimethoprim 800-160 MG per tablet    BACTRIM DS    6 tablet    Take 1 tablet by mouth 2 times daily for 3 days    Acute cystitis with hematuria

## 2018-09-17 NOTE — LETTER
September 19, 2018      Rima Allred  [address] [place] [id number]        Dear ,    We are writing to inform you of your test results.    -Urine culture is abnormal and grew out bacteria that are sensitive to the antibiotic you have been given.  Complete the medication as prescribed and if you experience new, worsening or persistent symptoms, you should call or return for a recheck.  Result Value Ref Range    Color Urine Yellow     Appearance Urine Slightly Cloudy     Glucose Urine Negative NEG^Negative mg/dL    Bilirubin Urine Negative NEG^Negative    Ketones Urine Negative NEG^Negative mg/dL    Specific Gravity Urine 1.010 1.003 - 1.035    Blood Urine Large (A) NEG^Negative    pH Urine 7.0 5.0 - 7.0 pH    Protein Albumin Urine Negative NEG^Negative mg/dL    Urobilinogen Urine 0.2 0.2 - 1.0 EU/dL    Nitrite Urine Negative NEG^Negative    Leukocyte Esterase Urine Large (A) NEG^Negative    Source Midstream Urine    Urine Microscopic   Result Value Ref Range    WBC Urine 10-25 (A) OTO5^0 - 5 /HPF    RBC Urine 2-5 (A) OTO2^O - 2 /HPF    Bacteria Urine Few (A) NEG^Negative /HPF   Urine Culture Aerobic Bacterial    Culture Micro 10,000 to 50,000 colonies/mL  Escherichia coli  (A)      If you have any questions or concerns, please call the clinic at the number listed above.     Sincerely,    Ragini Cunningham PA-C

## 2018-09-19 LAB
BACTERIA SPEC CULT: ABNORMAL
BACTERIA SPEC CULT: ABNORMAL
C TRACH DNA SPEC QL NAA+PROBE: NEGATIVE
SPECIMEN SOURCE: ABNORMAL
SPECIMEN SOURCE: NORMAL

## 2018-09-19 NOTE — PROGRESS NOTES
Lab letter printed and signed.  Message comments below:  -Urine culture is abnormal and grew out bacteria that are sensitive to the antibiotic you have been given.  Complete the medication as prescribed and if you experience new, worsening or persistent symptoms, you should call or return for a recheck.

## 2018-11-29 ENCOUNTER — OFFICE VISIT (OUTPATIENT)
Dept: FAMILY MEDICINE | Facility: CLINIC | Age: 23
End: 2018-11-29
Payer: COMMERCIAL

## 2018-11-29 VITALS
HEART RATE: 95 BPM | DIASTOLIC BLOOD PRESSURE: 76 MMHG | WEIGHT: 172 LBS | BODY MASS INDEX: 25.96 KG/M2 | SYSTOLIC BLOOD PRESSURE: 110 MMHG | OXYGEN SATURATION: 98 % | TEMPERATURE: 97.7 F | RESPIRATION RATE: 14 BRPM

## 2018-11-29 DIAGNOSIS — Z00.00 ROUTINE GENERAL MEDICAL EXAMINATION AT A HEALTH CARE FACILITY: Primary | ICD-10-CM

## 2018-11-29 PROCEDURE — 99395 PREV VISIT EST AGE 18-39: CPT | Performed by: FAMILY MEDICINE

## 2018-11-29 NOTE — PROGRESS NOTES
"  SUBJECTIVE:   Rima Allred is a 22 year old female who presents to clinic today for the following health issues:      Paper work for studying abroad       Duration: December 29 th 2018 for 6 mo            {additional problems for provider to add:039037}    Problem list and histories reviewed & adjusted, as indicated.  Additional history: {NONE - AS DOCUMENTED:593145::\"as documented\"}    {HIST REVIEW/ LINKS 2:870978}    Reviewed and updated as needed this visit by clinical staff       Reviewed and updated as needed this visit by Provider         {PROVIDER CHARTING PREFERENCE:217189}  "

## 2018-11-29 NOTE — NURSING NOTE
"Chief Complaint   Patient presents with     Physical     /76 (BP Location: Right arm, Patient Position: Sitting, Cuff Size: Adult Small)  Pulse 95  Temp 97.7  F (36.5  C) (Tympanic)  Resp 14  Wt 172 lb (78 kg)  LMP 11/19/2018  SpO2 98%  BMI 25.96 kg/m2 Estimated body mass index is 25.96 kg/(m^2) as calculated from the following:    Height as of 2/7/17: 5' 8.25\" (1.734 m).    Weight as of this encounter: 172 lb (78 kg).  BP completed using cuff size: regular   Ashley Mota CMA    Health Maintenance Due   Topic Date Due     HPV IMMUNIZATION (1 of 3 - Female 3 Dose Series) 12/27/2008     PEDS DTAP/TDAP (2 - Td) 04/21/2011     HIV SCREEN (SYSTEM ASSIGNED)  12/27/2015     Health Maintenance reviewed at today's visit patient asked to schedule/complete:   Immunizations:  Patient agrees to schedule    "

## 2018-11-29 NOTE — MR AVS SNAPSHOT
After Visit Summary   11/29/2018    Rima Allred    MRN: [id number]           Patient Information     Date Of Birth          1995        Visit Information        Provider Department      11/29/2018 4:00 PM Joyce Stock MD [facility]        Care Instructions    1. No difference was  Noted by patients in a double blind study when given codeine, tylenol ( acetaminophen) or ibuprofen (all in identical pills). They felt no difference in pain relief. Since ibuprofen and the NSAIDs  causes kidney damage, esophageal damage with heartburn, and can increase the risk of esophageal and stomach cancer and ulcers,and colonic strictures. They also cause increased risk of heart attack .   I recommend that you use tylenol(acetaminophen) for pain. Use the acetaminophen ES  Which has 500mgm/tablet You can take up to 2 tablets 4 times a day as need for pain.  If this is not enough, you can add in ibuprofen or aleve(naprosyn) with 2 glasses of fluid and some food-to protect the stomach and esophagus. Please let us know if you are continuing to take ibuprofen or aleve, as we will need to periodically check your kidney function with a blood test.    What Kids Should Know About HPV and Cancer     What is HPV?   HPV (Human Papillomavirus) is a very common virus. You get it by kissing or touching another person's genitals (skin-to-skin genital contact). HPV can lead to genital warts and many types of cancer. There is a vaccine to prevent HPV.  Why do I need an HPV vaccine?     Even if you are not sexually active right now, the vaccine will protect you in the future. Protection from the shots will last many, many years and likely your entire lifetime.    HPV is so common that you will likely be exposed to it in your lifetime.    You may not be able to tell if you or someone else has HPV.  When should I get vaccinated?    It takes a series of 3 shots to be fully protected  from HPV. You will need:  ? The first shot as early as age 9 (typically given at age 11 or 12).  ? The second shot about 1 to 2 months after the first shot.  ? The third shot about 6 months after the first shot.    It is important to get all 3 shots before you become sexually active. (You may still get the series after becoming sexually active, but this is less ideal.)    Call your doctor with questions. Your conversations will be confidential.  What will happen when I get the shot?     You may have a sore arm.    You may feel faint. Your doctor may have you wait 15 minutes before leaving.  For more information  For more information on HPV's role in causing cancer or the HPV vaccine, please visit:   http://www.cancer.org.  For informational purposes only. Not to replace the advice of your health care provider. Published 2015 by [facility]. A collaboration between [facility] and [facility]. Image [id number] courtesy of iStock.com/Christiano Dorsey. Smartworks 105303 - 12/15. Text is dedicated to the public domain.      2.  Weight Loss Tips  1. Do not eat after 6 hrs before your expected bedtime  2. Have your heaviest meal for breakfast, a slightly lighter meal at lunch and a snack 6 hrs before bed  3. No sugar/calorie drinks except milk ie no fruit juice, pop, alcohol.  4. Drink milk 30min before meals to decrease your hunger. Also it is excellent as part of your last meal of the day snack  5. Drink lots of water  6. Increase fiber in diet: all bran cereal, salads, popcorn etc  7. Have only one small serving of fruit a day about 1/2 cup (as this is high in sugar)  8. EXERCISE is the bottom line. Without it, you will gain weight even on a low calorie diet. Best if done 2-3X a day as can    Being overweight contributes to high blood pressure and high cholesterol, both of which cause heart attacks, strokes and kidney failure, prediabetes and diabetes, arthritis,  and liver disease     4. T gel at [facility]   Apply and leave on 10 min before wash off     5. Please do your breast exam every mo, when you  Change the  calendar page or set an alarm on your cell phone Do a  visual check for dimples, inversion or indentation or any different position of the nipple Feel manually  for any 1cm or larger  size mass ie about the size of an almond Be sure to cover the entire area of both breasts : this extends back to the back on either side and from the collar bone to the bottom of the breasts where you can begin to feel ribs.  Men are advised to do this exam also as they now have a higher rate of breast cancer , like women do .             Follow-ups after your visit        Who to contact     If you have questions or need follow up information about today's clinic visit or your schedule please contact [facility] directly at [phone].  Normal or non-critical lab and imaging results will be communicated to you by MyChart, letter or phone within 4 business days after the clinic has received the results. If you do not hear from us within 7 days, please contact the clinic through MyChart or phone. If you have a critical or abnormal lab result, we will notify you by phone as soon as possible.  Submit refill requests through [employer] or call your pharmacy and they will forward the refill request to us. Please allow 3 business days for your refill to be completed.          Additional Information About Your Visit        Care EveryWhere ID     This is your Care EveryWhere ID. This could be used by other organizations to access your [facility] medical records  [id number]        Your Vitals Were     Pulse Temperature Respirations Last Period Pulse Oximetry BMI (Body Mass Index)    95 97.7  F (36.5  C) (Tympanic) 14 11/19/2018 98% 25.96 kg/m2       Blood Pressure from Last 3 Encounters:   11/29/18 110/76   09/17/18 122/64   12/28/17 112/74    Weight from Last 3  Encounters:   11/29/18 172 lb (78 kg)   09/17/18 159 lb (72.1 kg)   12/28/17 170 lb (77.1 kg)              Today, you had the following     No orders found for display         Today's Medication Changes          These changes are accurate as of 11/29/18  4:39 PM.  If you have any questions, ask your nurse or doctor.               Stop taking these medicines if you haven't already. Please contact your care team if you have questions.     desogestrel-ethinyl estradiol 0.15-30 MG-MCG tablet   Commonly known as:  APRI   Stopped by:  Joyce Stock MD                    Primary Care Provider Office Phone # Fax #    Ragini Cunningham PA-C [phone] [address] [place] [address] [address]        Equal Access to Services     [facility] : Hadii iqra kaye hadasho Sobeth, waaxda luqadaha, qaybta kaalmada adeegyada, ulices flores . So [facility] [phone].    ATENCIÓN: Si habla español, tiene a lema disposición servicios gratuitos de asistencia lingüística. Matilde al [phone].    We comply with applicable federal civil rights laws and [place] laws. We do not discriminate on the basis of race, color, national origin, age, disability, sex, sexual orientation, or gender identity.            Thank you!     Thank you for choosing [facility]  for your care. Our goal is always to provide you with excellent care. Hearing back from our patients is one way we can continue to improve our services. Please take a few minutes to complete the written survey that you may receive in the mail after your visit with us. Thank you!             Your Updated Medication List - Protect others around you: Learn how to safely use, store and throw away your medicines at www.disposemymeds.org.          This list is accurate as of 11/29/18  4:39 PM.  Always use your most recent med list.                   Brand Name Dispense Instructions for use  Diagnosis    IBUPROFEN PO      Take 200 mg by mouth as needed for moderate pain        ketoconazole 2 % external shampoo    NIZORAL    120 mL    Apply to damp skin, lather, leave on 5 minutes, and rinse. Use 3-4 times weekly until symptoms improve.    Seborrheic dermatitis

## 2018-11-29 NOTE — PATIENT INSTRUCTIONS
1. No difference was  Noted by patients in a double blind study when given codeine, tylenol ( acetaminophen) or ibuprofen (all in identical pills). They felt no difference in pain relief. Since ibuprofen and the NSAIDs  causes kidney damage, esophageal damage with heartburn, and can increase the risk of esophageal and stomach cancer and ulcers,and colonic strictures. They also cause increased risk of heart attack .   I recommend that you use tylenol(acetaminophen) for pain. Use the acetaminophen ES  Which has 500mgm/tablet You can take up to 2 tablets 4 times a day as need for pain.  If this is not enough, you can add in ibuprofen or aleve(naprosyn) with 2 glasses of fluid and some food-to protect the stomach and esophagus. Please let us know if you are continuing to take ibuprofen or aleve, as we will need to periodically check your kidney function with a blood test.    What Kids Should Know About HPV and Cancer     What is HPV?   HPV (Human Papillomavirus) is a very common virus. You get it by kissing or touching another person's genitals (skin-to-skin genital contact). HPV can lead to genital warts and many types of cancer. There is a vaccine to prevent HPV.  Why do I need an HPV vaccine?     Even if you are not sexually active right now, the vaccine will protect you in the future. Protection from the shots will last many, many years and likely your entire lifetime.    HPV is so common that you will likely be exposed to it in your lifetime.    You may not be able to tell if you or someone else has HPV.  When should I get vaccinated?    It takes a series of 3 shots to be fully protected from HPV. You will need:  ? The first shot as early as age 9 (typically given at age 11 or 12).  ? The second shot about 1 to 2 months after the first shot.  ? The third shot about 6 months after the first shot.    It is important to get all 3 shots before you become sexually active. (You may still get the series after becoming  sexually active, but this is less ideal.)    Call your doctor with questions. Your conversations will be confidential.  What will happen when I get the shot?     You may have a sore arm.    You may feel faint. Your doctor may have you wait 15 minutes before leaving.  For more information  For more information on HPV's role in causing cancer or the HPV vaccine, please visit:   http://www.cancer.org.  For informational purposes only. Not to replace the advice of your health care provider. Published 2015 by [facility]. A collaboration between [facility] and [facility]. Image [id number] courtesy of iStock.com/Christiano Dorsey. Smartworks 474100 - 12/15. Text is dedicated to the public domain.      2.  Weight Loss Tips  1. Do not eat after 6 hrs before your expected bedtime  2. Have your heaviest meal for breakfast, a slightly lighter meal at lunch and a snack 6 hrs before bed  3. No sugar/calorie drinks except milk ie no fruit juice, pop, alcohol.  4. Drink milk 30min before meals to decrease your hunger. Also it is excellent as part of your last meal of the day snack  5. Drink lots of water  6. Increase fiber in diet: all bran cereal, salads, popcorn etc  7. Have only one small serving of fruit a day about 1/2 cup (as this is high in sugar)  8. EXERCISE is the bottom line. Without it, you will gain weight even on a low calorie diet. Best if done 2-3X a day as can    Being overweight contributes to high blood pressure and high cholesterol, both of which cause heart attacks, strokes and kidney failure, prediabetes and diabetes, arthritis, and liver disease     4. T gel at [facility]   Apply and leave on 10 min before wash off     5. Please do your breast exam every mo, when you  Change the  calendar page or set an alarm on your cell phone Do a  visual check for dimples, inversion or indentation or any different position of the nipple Feel manually  for any 1cm or  larger  size mass ie about the size of an almond Be sure to cover the entire area of both breasts : this extends back to the back on either side and from the collar bone to the bottom of the breasts where you can begin to feel ribs.  Men are advised to do this exam also as they now have a higher rate of breast cancer , like women do .       Preventive Health Recommendations  Female Ages 18 to 20     Yearly exam:     See your health care provider every year in order to  o Review health changes.   o Discuss preventive care.    o Review your medicines if your doctor has prescribed any.      You should be tested each year for STDs (sexually transmitted diseases).       After age 20, talk to your provider about how often you should have cholesterol testing.      If you are at risk for diabetes, you should have a diabetes test (fasting glucose).     Shots:     Get a flu shot each year.     Get a tetanus shot every 10 years.     Consider getting the shot (vaccine) that prevents cervical cancer (Gardasil).    Nutrition:     Eat at least 5 servings of fruits and vegetables each day.    Eat whole-grain bread, whole-wheat pasta and brown rice instead of white grains and rice.    Get adequate Calcium and Vitamin D.     Lifestyle    Exercise at least 150 minutes a week each week (30 minutes a day, 5 days a week). This will help you control your weight and prevent disease.    No smoking.     Wear sunscreen to prevent skin cancer.    See your dentist every six months for an exam and cleaning.

## 2018-11-29 NOTE — PROGRESS NOTES
SUBJECTIVE:   CC: Rima Allred is an 22 year old woman who presents for preventive health visit.     Healthy Habits:    Do you get at least three servings of calcium containing foods daily (dairy, green leafy vegetables, etc.)? yes    Amount of exercise or daily activities, outside of work: 3-4 day(s) per week    Problems taking medications regularly No    Medication side effects: No    Have you had an eye exam in the past two years? yes    Do you see a dentist twice per year? yes    Do you have sleep apnea, excessive snoring or daytime drowsiness?no          Today's PHQ-2 Score:   PHQ-2 ( 1999 [employer]) 11/29/2018 9/17/2018   Q1: Little interest or pleasure in doing things 0 0   Q2: Feeling down, depressed or hopeless 0 0   PHQ-2 Score 0 0       Abuse: Current or Past(Physical, Sexual or Emotional)- No  Do you feel safe in your environment? Yes    Social History   Substance Use Topics     Smoking status: Never Smoker     Smokeless tobacco: Never Used     Alcohol use No     If you drink alcohol do you typically have >3 drinks per day or >7 drinks per week? Yes - AUDIT SCORE:     No flowsheet data found.                     Reviewed orders with patient.  Reviewed health maintenance and updated orders accordingly - Yes  Labs reviewed in [facility]    Mammogram not appropriate for this patient based on age.    Pertinent mammograms are reviewed under the imaging tab.  History of abnormal Pap smear: NO - under age 21, PAP not appropriate for age     Reviewed and updated as needed this visit by clinical staff  Tobacco  Allergies  Meds  Problems  Med Hx  Surg Hx  Fam Hx  Soc Hx          Reviewed and updated as needed this visit by Provider  Allergies  Meds  Problems            ROS:  CONSTITUTIONAL: NEGATIVE for fever, chills, change in weight  INTEGUMENTARU/SKIN: NEGATIVE for worrisome rashes, moles or lesions  EYES: NEGATIVE for vision changes or irritation  ENT: NEGATIVE for ear, mouth and throat problems  RESP:  NEGATIVE for significant cough or SOB  BREAST: NEGATIVE for masses, tenderness or discharge  CV: NEGATIVE for chest pain, palpitations or peripheral edema  GI: NEGATIVE for nausea, abdominal pain, heartburn, or change in bowel habits  : NEGATIVE for unusual urinary or vaginal symptoms. Periods are regular.  MUSCULOSKELETAL: NEGATIVE for significant arthralgias or myalgia  NEURO: NEGATIVE for weakness, dizziness or paresthesias  PSYCHIATRIC: NEGATIVE for changes in mood or affect    OBJECTIVE:   /76 (BP Location: Right arm, Patient Position: Sitting, Cuff Size: Adult Small)  Pulse 95  Temp 97.7  F (36.5  C) (Tympanic)  Resp 14  Wt 172 lb (78 kg)  LMP 11/19/2018  SpO2 98%  BMI 25.96 kg/m2  EXAM:  GENERAL: healthy, alert and no distress  EYES: Eyes grossly normal to inspection, PERRL and conjunctivae and sclerae normal  RESP: lungs clear to auscultation - no rales, rhonchi or wheezes  CV: regular rate and rhythm, normal S1 S2, no S3 or S4, no murmur, click or rub, no peripheral edema and peripheral pulses strong  MS: no gross musculoskeletal defects noted, no edema  SKIN: no suspicious lesions or rashes  NEURO: Normal strength and tone, mentation intact and speech normal  PSYCH: mentation appears normal, affect normal/bright    Diagnostic Test Results:  Results for orders placed or performed in visit on 09/17/18   UA reflex to Microscopic and Culture   Result Value Ref Range    Color Urine Yellow     Appearance Urine Slightly Cloudy     Glucose Urine Negative NEG^Negative mg/dL    Bilirubin Urine Negative NEG^Negative    Ketones Urine Negative NEG^Negative mg/dL    Specific Gravity Urine 1.010 1.003 - 1.035    Blood Urine Large (A) NEG^Negative    pH Urine 7.0 5.0 - 7.0 pH    Protein Albumin Urine Negative NEG^Negative mg/dL    Urobilinogen Urine 0.2 0.2 - 1.0 EU/dL    Nitrite Urine Negative NEG^Negative    Leukocyte Esterase Urine Large (A) NEG^Negative    Source Midstream Urine    Urine Microscopic  "  Result Value Ref Range    WBC Urine 10-25 (A) OTO5^0 - 5 /HPF    RBC Urine 2-5 (A) OTO2^O - 2 /HPF    Bacteria Urine Few (A) NEG^Negative /HPF   Chlamydia trachomatis PCR   Result Value Ref Range    Specimen Description Urine     Chlamydia Trachomatis PCR Negative NEG^Negative   Urine Culture Aerobic Bacterial   Result Value Ref Range    Specimen Description Midstream Urine     Culture Micro 10,000 to 50,000 colonies/mL  Escherichia coli   (A)     Culture Micro       <10,000 colonies/mL  mixed urogenital zari  Susceptibility testing not routinely done         Susceptibility    Escherichia coli - ERNIE     AMPICILLIN 4 Sensitive ug/mL     CEFAZOLIN* <=4 Sensitive ug/mL      * Cefazolin ERNIE breakpoints are for the treatment of uncomplicated urinary tract infections.  For the treatment of systemic infections, please contact the laboratory for additional testing.     CEFOXITIN <=4 Sensitive ug/mL     CEFTAZIDIME <=1 Sensitive ug/mL     CEFTRIAXONE <=1 Sensitive ug/mL     CIPROFLOXACIN <=0.25 Sensitive ug/mL     GENTAMICIN <=1 Sensitive ug/mL     LEVOFLOXACIN <=0.12 Sensitive ug/mL     NITROFURANTOIN <=16 Sensitive ug/mL     TOBRAMYCIN <=1 Sensitive ug/mL     Trimethoprim/Sulfa <=1/19 Sensitive ug/mL     AMPICILLIN/SULBACTAM <=2 Sensitive ug/mL     Piperacillin/Tazo <=4 Sensitive ug/mL     CEFEPIME <=1 Sensitive ug/mL       ASSESSMENT/PLAN:       ICD-10-CM    1. Routine general medical examination at a health care facility for 6 mo study in [place]  Z00.00        COUNSELING:   Reviewed preventive health counseling, as reflected in patient instructions       Regular exercise       Healthy diet/nutrition    BP Readings from Last 1 Encounters:   11/29/18 110/76     Estimated body mass index is 25.96 kg/(m^2) as calculated from the following:    Height as of 2/7/17: 5' 8.25\" (1.734 m).    Weight as of this encounter: 172 lb (78 kg).                       reports that she has never smoked. She has never used smokeless " tobacco.      Counseling Resources:  ATP IV Guidelines  Pooled Cohorts Equation Calculator  Breast Cancer Risk Calculator  FRAX Risk Assessment  ICSI Preventive Guidelines  Dietary Guidelines for Americans, 2010  USDA's MyPlate  ASA Prophylaxis  Lung CA Screening    Joyce Stock MD  [facility]

## 2018-12-14 ENCOUNTER — OFFICE VISIT (OUTPATIENT)
Dept: FAMILY MEDICINE | Facility: CLINIC | Age: 23
End: 2018-12-14
Payer: COMMERCIAL

## 2018-12-14 VITALS
DIASTOLIC BLOOD PRESSURE: 70 MMHG | TEMPERATURE: 98 F | SYSTOLIC BLOOD PRESSURE: 110 MMHG | WEIGHT: 164 LBS | HEART RATE: 77 BPM | RESPIRATION RATE: 16 BRPM | BODY MASS INDEX: 24.75 KG/M2

## 2018-12-14 DIAGNOSIS — R10.13 ABDOMINAL PAIN, EPIGASTRIC: Primary | ICD-10-CM

## 2018-12-14 DIAGNOSIS — K59.00 CONSTIPATION, UNSPECIFIED CONSTIPATION TYPE: ICD-10-CM

## 2018-12-14 LAB
ERYTHROCYTE [DISTWIDTH] IN BLOOD BY AUTOMATED COUNT: 12.2 % (ref 10–15)
ERYTHROCYTE [SEDIMENTATION RATE] IN BLOOD BY WESTERGREN METHOD: 7 MM/H (ref 0–20)
HCT VFR BLD AUTO: 36.9 % (ref 35–47)
HGB BLD-MCNC: 13 G/DL (ref 11.7–15.7)
MCH RBC QN AUTO: 30.5 PG (ref 26.5–33)
MCHC RBC AUTO-ENTMCNC: 35.2 G/DL (ref 31.5–36.5)
MCV RBC AUTO: 87 FL (ref 78–100)
PLATELET # BLD AUTO: 222 10E9/L (ref 150–450)
RBC # BLD AUTO: 4.26 10E12/L (ref 3.8–5.2)
WBC # BLD AUTO: 6.2 10E9/L (ref 4–11)

## 2018-12-14 PROCEDURE — 85652 RBC SED RATE AUTOMATED: CPT | Performed by: PHYSICIAN ASSISTANT

## 2018-12-14 PROCEDURE — 83516 IMMUNOASSAY NONANTIBODY: CPT | Mod: 59 | Performed by: PHYSICIAN ASSISTANT

## 2018-12-14 PROCEDURE — 83516 IMMUNOASSAY NONANTIBODY: CPT | Performed by: PHYSICIAN ASSISTANT

## 2018-12-14 PROCEDURE — 99214 OFFICE O/P EST MOD 30 MIN: CPT | Performed by: PHYSICIAN ASSISTANT

## 2018-12-14 PROCEDURE — 85027 COMPLETE CBC AUTOMATED: CPT | Performed by: PHYSICIAN ASSISTANT

## 2018-12-14 PROCEDURE — 36415 COLL VENOUS BLD VENIPUNCTURE: CPT | Performed by: PHYSICIAN ASSISTANT

## 2018-12-14 ASSESSMENT — ENCOUNTER SYMPTOMS
MUSCULOSKELETAL NEGATIVE: 1
EYES NEGATIVE: 1
RESPIRATORY NEGATIVE: 1
PSYCHIATRIC NEGATIVE: 1
CARDIOVASCULAR NEGATIVE: 1
ROS GI COMMENTS: AS IN HPI
NEUROLOGICAL NEGATIVE: 1
CONSTITUTIONAL NEGATIVE: 1

## 2018-12-14 NOTE — PROGRESS NOTES
"  SUBJECTIVE:   Rima Allred is a 22 year old female who presents to clinic today for the following health issues:    Abdominal Pain      Duration: 6 months    Description (location/character/radiation): \"all over\"- feels bloated all the time       Associated flank pain: None    Intensity:  moderate    Accompanying signs and symptoms: ongoing constipation, no appetite       Fever/Chills: no        Gas/Bloating: YES       Nausea/vomitting: occasional nausea       Diarrhea: no        Dysuria or Hematuria: no     History (previous similar pain/trauma/previous testing): no    Precipitating or alleviating factors:       Pain worse with eating/BM/urination: yes       Pain relieved by BM: YES    Therapies tried and outcome: Pepto bismol in the past    LMP:  11/19/18    Constant abdominal pain  Regardless of what she eats  Upper stomach more than lower stomach  Constipated  Rice is well tolerated - but then causes constipation  Coffee is very irritating   Vomited last week  No heartburn   No blood in the stool, no dark stools, no mucus in the stool   She is very bloated      Problem list and histories reviewed & adjusted, as indicated.  Additional history: as documented    Patient Active Problem List   Diagnosis     Warts     Seborrheic dermatitis     Dysmenorrhea     History reviewed. No pertinent surgical history.    Social History     Tobacco Use     Smoking status: Never Smoker     Smokeless tobacco: Never Used   Substance Use Topics     Alcohol use: No     Family History   Problem Relation Age of Onset     Cancer Paternal Grandfather         skin     Asthma Brother      Asthma Brother      Thyroid Disease Mother         hypothyroid     Unknown/Adopted Father      Diabetes No family hx of          Current Outpatient Medications   Medication Sig Dispense Refill     IBUPROFEN PO Take 200 mg by mouth as needed for moderate pain       ketoconazole (NIZORAL) 2 % shampoo Apply to damp skin, lather, leave on 5 minutes, and " rinse. Use 3-4 times weekly until symptoms improve. (Patient not taking: Reported on 12/14/2018) 120 mL 3     No Known Allergies    Reviewed and updated as needed this visit by clinical staff  Tobacco  Allergies  Meds  Med Hx  Surg Hx  Fam Hx  Soc Hx      Reviewed and updated as needed this visit by Provider         Review of Systems   Constitutional: Negative.    HENT: Negative.    Eyes: Negative.    Respiratory: Negative.    Cardiovascular: Negative.    Gastrointestinal:        As in HPI   Genitourinary: Negative.    Musculoskeletal: Negative.    Skin: Negative.    Neurological: Negative.    Psychiatric/Behavioral: Negative.        OBJECTIVE:     /70   Pulse 77   Temp 98  F (36.7  C) (Tympanic)   Resp 16   Wt 74.4 kg (164 lb)   LMP 11/19/2018   BMI 24.75 kg/m    Body mass index is 24.75 kg/m .    Physical Exam   Constitutional: She is oriented to person, place, and time. She appears well-developed and well-nourished. No distress.   HENT:   Head: Normocephalic.   Right Ear: External ear normal.   Left Ear: External ear normal.   Nose: Nose normal.   Eyes: Conjunctivae and EOM are normal.   Neck: Normal range of motion.   Cardiovascular: Normal rate, regular rhythm and normal heart sounds.   Pulmonary/Chest: Effort normal and breath sounds normal.   Abdominal: Soft. She exhibits no distension and no mass. There is tenderness (mild epigastric). There is no guarding.   Neurological: She is alert and oriented to person, place, and time.   Skin: She is not diaphoretic.   Psychiatric: She has a normal mood and affect. Judgment normal.       Results for orders placed or performed in visit on 12/14/18 (from the past 24 hour(s))   CBC with platelets   Result Value Ref Range    WBC 6.2 4.0 - 11.0 10e9/L    RBC Count 4.26 3.8 - 5.2 10e12/L    Hemoglobin 13.0 11.7 - 15.7 g/dL    Hematocrit 36.9 35.0 - 47.0 %    MCV 87 78 - 100 fl    MCH 30.5 26.5 - 33.0 pg    MCHC 35.2 31.5 - 36.5 g/dL    RDW 12.2 10.0 - 15.0  %    Platelet Count 222 150 - 450 10e9/L   ESR: Erythrocyte sedimentation rate   Result Value Ref Range    Sed Rate 7 0 - 20 mm/h       ASSESSMENT/PLAN:       ICD-10-CM    1. Abdominal pain, epigastric R10.13 Tissue transglutaminase raegan IgA and IgG     CBC with platelets     ESR: Erythrocyte sedimentation rate     GASTROENTEROLOGY ADULT REF CONSULT ONLY   2. Constipation, unspecified constipation type K59.00       Patient is leaving in 2 weeks for study abroad program in [place]  She will be gone for 6 months    Due to upcoming travel plans, I will send her to GI right away as well as start a trial of Zantac and omeprazole.  Labs ordered today.     Return in about 1 week (around 12/21/2018) for GI.    Patient Instructions   Start taking Zantac and Omeprazole over the counter.       Dylan Barber PA-C  [facility]

## 2018-12-14 NOTE — LETTER
December 18, 2018      Rima Allred  [address] [place] [phone]        Dear ,    We are writing to inform you of your test results.    Your test results fall within the expected range(s) or remain unchanged from previous results.  Please continue with current treatment plan.    Resulted Orders   Tissue transglutaminase raegan IgA and IgG   Result Value Ref Range    Tissue Transglutaminase Antibody IgA <1 <7 U/mL      Comment:      Negative  The tTG-IgA assay has limited utility for patients with decreased levels of   IgA. Screening for celiac disease should include IgA testing to rule out   selective IgA deficiency and to guide selection and interpretation of   serological testing. tTG-IgG testing may be positive in celiac disease   patients with IgA deficiency.      Tissue Transglutaminase Raegan IgG <1 <7 U/mL      Comment:      Negative   CBC with platelets   Result Value Ref Range    WBC 6.2 4.0 - 11.0 10e9/L    RBC Count 4.26 3.8 - 5.2 10e12/L    Hemoglobin 13.0 11.7 - 15.7 g/dL    Hematocrit 36.9 35.0 - 47.0 %    MCV 87 78 - 100 fl    MCH 30.5 26.5 - 33.0 pg    MCHC 35.2 31.5 - 36.5 g/dL    RDW 12.2 10.0 - 15.0 %    Platelet Count 222 150 - 450 10e9/L   ESR: Erythrocyte sedimentation rate   Result Value Ref Range    Sed Rate 7 0 - 20 mm/h       If you have any questions or concerns, please call the clinic at the number listed above.       Sincerely,        Tami Barber PA-C

## 2018-12-14 NOTE — LETTER
December 17, 2018      Rima Allred  [address] [place] [phone]        Dear ,    We are writing to inform you of your test results.    Your test results fall within the expected range(s) or remain unchanged from previous results.  Please continue with current treatment plan.    Resulted Orders   Tissue transglutaminase raegan IgA and IgG   Result Value Ref Range    Tissue Transglutaminase Antibody IgA <1 <7 U/mL      Comment:      Negative  The tTG-IgA assay has limited utility for patients with decreased levels of   IgA. Screening for celiac disease should include IgA testing to rule out   selective IgA deficiency and to guide selection and interpretation of   serological testing. tTG-IgG testing may be positive in celiac disease   patients with IgA deficiency.      Tissue Transglutaminase Raegan IgG <1 <7 U/mL      Comment:      Negative   CBC with platelets   Result Value Ref Range    WBC 6.2 4.0 - 11.0 10e9/L    RBC Count 4.26 3.8 - 5.2 10e12/L    Hemoglobin 13.0 11.7 - 15.7 g/dL    Hematocrit 36.9 35.0 - 47.0 %    MCV 87 78 - 100 fl    MCH 30.5 26.5 - 33.0 pg    MCHC 35.2 31.5 - 36.5 g/dL    RDW 12.2 10.0 - 15.0 %    Platelet Count 222 150 - 450 10e9/L   ESR: Erythrocyte sedimentation rate   Result Value Ref Range    Sed Rate 7 0 - 20 mm/h       If you have any questions or concerns, please call the clinic at the number listed above.       Sincerely,        Tami Barber PA-C

## 2018-12-17 LAB
TTG IGA SER-ACNC: <1 U/ML
TTG IGG SER-ACNC: <1 U/ML

## 2019-11-25 ENCOUNTER — OFFICE VISIT (OUTPATIENT)
Dept: FAMILY MEDICINE | Facility: CLINIC | Age: 24
End: 2019-11-25
Payer: COMMERCIAL

## 2019-11-25 VITALS
BODY MASS INDEX: 26.05 KG/M2 | WEIGHT: 171.9 LBS | DIASTOLIC BLOOD PRESSURE: 69 MMHG | HEIGHT: 68 IN | SYSTOLIC BLOOD PRESSURE: 107 MMHG | OXYGEN SATURATION: 99 % | TEMPERATURE: 98.8 F | HEART RATE: 91 BPM

## 2019-11-25 DIAGNOSIS — J02.9 SORE THROAT: Primary | ICD-10-CM

## 2019-11-25 LAB
DEPRECATED S PYO AG THROAT QL EIA: NORMAL
SPECIMEN SOURCE: NORMAL

## 2019-11-25 PROCEDURE — 99213 OFFICE O/P EST LOW 20 MIN: CPT | Performed by: NURSE PRACTITIONER

## 2019-11-25 PROCEDURE — 87081 CULTURE SCREEN ONLY: CPT | Performed by: NURSE PRACTITIONER

## 2019-11-25 PROCEDURE — 87880 STREP A ASSAY W/OPTIC: CPT | Performed by: NURSE PRACTITIONER

## 2019-11-25 ASSESSMENT — ENCOUNTER SYMPTOMS
SINUS PAIN: 0
SORE THROAT: 1
VOMITING: 0
ABDOMINAL PAIN: 0
RHINORRHEA: 0
MYALGIAS: 1
FATIGUE: 1
HEADACHES: 1
LIGHT-HEADEDNESS: 0
COUGH: 0
DIZZINESS: 0
NAUSEA: 0
FEVER: 0
DIARRHEA: 0

## 2019-11-25 ASSESSMENT — MIFFLIN-ST. JEOR: SCORE: 1593.23

## 2019-11-25 NOTE — PROGRESS NOTES
"SUBJECTIVE:   Rima Allred is a 23 year old female presenting with a chief complaint of   Chief Complaint   Patient presents with     Pharyngitis     X 1 day       She is an established patient of [facility].    Sore Throat    Onset of symptoms was 1 day(s) ago.  Course of illness is same.    Severity moderate  Current and Associated symptoms: sore throat, body aches and fatigue  Treatment measures tried include Tylenol/Ibuprofen.  Predisposing factors include None.  Does still have her tonsils. Does have history of mono in the past.       Review of Systems   Constitutional: Positive for fatigue. Negative for fever.   HENT: Positive for sore throat. Negative for congestion, ear pain, rhinorrhea and sinus pain.    Respiratory: Negative for cough.    Gastrointestinal: Negative for abdominal pain, diarrhea, nausea and vomiting.   Musculoskeletal: Positive for myalgias.   Skin: Negative for rash.   Neurological: Positive for headaches. Negative for dizziness and light-headedness.       History reviewed. No pertinent past medical history.  Family History   Problem Relation Age of Onset     Cancer Paternal Grandfather         skin     Asthma Brother      Asthma Brother      Thyroid Disease Mother         hypothyroid     Unknown/Adopted Father      Diabetes No family hx of      Current Outpatient Medications   Medication Sig Dispense Refill     IBUPROFEN PO Take 200 mg by mouth as needed for moderate pain       ketoconazole (NIZORAL) 2 % shampoo Apply to damp skin, lather, leave on 5 minutes, and rinse. Use 3-4 times weekly until symptoms improve. (Patient not taking: Reported on 12/14/2018) 120 mL 3     Social History     Tobacco Use     Smoking status: Never Smoker     Smokeless tobacco: Never Used   Substance Use Topics     Alcohol use: No       OBJECTIVE  /69   Pulse 91   Temp 98.8  F (37.1  C)   Ht 1.727 m (5' 8\")   Wt 78 kg (171 lb 14.4 oz)   SpO2 99%   BMI 26.14 kg/m      Physical Exam  Vitals signs " and nursing note reviewed.   Constitutional:       Appearance: Normal appearance. She is well-developed.   HENT:      Head: Normocephalic and atraumatic.      Right Ear: Ear canal and external ear normal. A middle ear effusion is present.      Left Ear: Ear canal and external ear normal. A middle ear effusion is present.      Nose: Congestion and rhinorrhea present.      Right Sinus: Maxillary sinus tenderness and frontal sinus tenderness present.      Left Sinus: Maxillary sinus tenderness and frontal sinus tenderness present.      Mouth/Throat:      Pharynx: Posterior oropharyngeal erythema present. No oropharyngeal exudate.      Tonsils: No tonsillar exudate. Swellin+ on the right. 1+ on the left.   Eyes:      Extraocular Movements: Extraocular movements intact.      Conjunctiva/sclera: Conjunctivae normal.      Pupils: Pupils are equal, round, and reactive to light.   Neck:      Musculoskeletal: Normal range of motion and neck supple.   Cardiovascular:      Rate and Rhythm: Normal rate and regular rhythm.      Heart sounds: Normal heart sounds.   Pulmonary:      Effort: Pulmonary effort is normal.      Breath sounds: Normal breath sounds and air entry.   Lymphadenopathy:      Head:      Right side of head: Submandibular and tonsillar adenopathy present.      Left side of head: Submandibular and tonsillar adenopathy present.      Cervical: No cervical adenopathy.   Skin:     General: Skin is warm and dry.   Neurological:      Mental Status: She is alert and oriented to person, place, and time.   Psychiatric:         Behavior: Behavior is cooperative.         Labs:  Results for orders placed or performed in visit on 19 (from the past 24 hour(s))   Rapid strep screen   Result Value Ref Range    Specimen Description Throat     Rapid Strep A Screen       NEGATIVE: No Group A streptococcal antigen detected by immunoassay, await culture report.       ASSESSMENT:      ICD-10-CM    1. Sore throat J02.9 Rapid  strep screen     Beta strep group A culture        Medical Decision Making:    Differential Diagnosis:  URI Adult/Peds:  Laryngitis, Strep pharyngitis and Viral pharyngitis    Serious Comorbid Conditions:  Adult:  None    PLAN:  Discussed with patient that rapid strep was negative. Will do confirmatory testing. Favor a viral illness. Symptomatic treatment recommendations discussed. Education was added to AVS. Patient was agreeable to plan and verbalized understanding.     Followup:    If not improving in 5-7 days or if condition worsens, follow up with your Primary Care Provider    Patient Instructions   Push Fluids  Plenty of rest  Tylenol and ibuprofen to help with pain or fever  Humidified air can help with congestion  Nasal saline or Flonase nasal spray to help with congestion  Salt water gargles, anesthetic throat spray, or lozenges to help with sore throat.

## 2019-11-25 NOTE — PATIENT INSTRUCTIONS
Push Fluids  Plenty of rest  Tylenol and ibuprofen to help with pain or fever  Humidified air can help with congestion  Nasal saline or Flonase nasal spray to help with congestion  Salt water gargles, anesthetic throat spray, or lozenges to help with sore throat.

## 2019-11-26 LAB
BACTERIA SPEC CULT: NORMAL
SPECIMEN SOURCE: NORMAL

## 2019-12-11 ENCOUNTER — TELEPHONE (OUTPATIENT)
Dept: FAMILY MEDICINE | Facility: CLINIC | Age: 24
End: 2019-12-11

## 2019-12-11 NOTE — TELEPHONE ENCOUNTER
Panel Management Review      Patient has the following on her problem list: None      Composite cancer screening  Chart review shows that this patient is due/due soon for the following Pap Smear  Summary:    Patient is due/failing the following:   PAP and PHYSICAL    Action needed:   Patient needs office visit for Pap and Physical.    Type of outreach:    Sent letter.    Questions for provider review:    None                                                                                                                                    Ashley Mota CMA       Chart routed to  .

## 2020-11-30 NOTE — NURSING NOTE
"Chief Complaint   Patient presents with     Vaginal Problem       Initial /74  Pulse 78  Temp 97  F (36.1  C) (Tympanic)  Resp 14  Wt 170 lb (77.1 kg)  LMP 12/21/2017  SpO2 98%  BMI 25.66 kg/m2 Estimated body mass index is 25.66 kg/(m^2) as calculated from the following:    Height as of 2/7/17: 5' 8.25\" (1.734 m).    Weight as of this encounter: 170 lb (77.1 kg).  Medication Reconciliation: complete      "
[facility]

## 2021-12-22 ENCOUNTER — VIRTUAL VISIT (OUTPATIENT)
Dept: URGENT CARE | Facility: CLINIC | Age: 26
End: 2021-12-22
Payer: COMMERCIAL

## 2021-12-22 DIAGNOSIS — B37.31 CANDIDIASIS OF VAGINA: Primary | ICD-10-CM

## 2021-12-22 PROCEDURE — 99203 OFFICE O/P NEW LOW 30 MIN: CPT | Mod: 95

## 2021-12-22 RX ORDER — FLUCONAZOLE 150 MG/1
150 TABLET ORAL ONCE
Qty: 1 TABLET | Refills: 0 | Status: SHIPPED | OUTPATIENT
Start: 2021-12-22 | End: 2021-12-22

## 2021-12-22 NOTE — PROGRESS NOTES
Rima Allred is being evaluated via a billable video visit.      Assessment & Plan:     Suspect vaginal candidiasis, Rx fluconazole. No urinary symptoms or concern for STI.    See patient instructions below.  At the end of the encounter, I discussed results, diagnosis, medications. Discussed red flags for being seen in person at clinic/ER, as well as indications for follow up if no improvement. Patient understood and agreed to plan.       ICD-10-CM    1. Candidiasis of vagina  B37.3 fluconazole (DIFLUCAN) 150 MG tablet         Return in about 3 days (around 12/25/2021) for Follow up w/ primary care provider if not better.    Video-Visit Details    Type of service:  Video Visit    Video Start Time: 11:33am  Video End Time: 11:37am    Originating Location (pt. Location): Home    Distant Location (provider location):  [facility]     Platform used for Video Visit: AVELINA Rey PA-C  crossvertise UNSCHEDULED CARE    Subjective:   Rima Allred is a 25 year old female who is contacted via telephone thru scheduled urgent care virtual visit to discuss:   Chief Complaint   Patient presents with     Vaginal Problem     White vaginal discharge, swelling, and itching onset 3 days ago. She has tried Monistat which helped somewhat but symptoms did not fully resolve. Patient reports no fever/chills, urinary symptoms, abdominal pain, nausea, vomiting, diarrhea, rash, or any other symptoms. No concern for STIs. She hasn't taken any recent antibiotics.    No past medical history on file.    Objective:   Gen:  NAD  Pulm: non-labored work of breathing    No results found for any visits on 12/22/21.    Patient Instructions     Please take Diflucan 150mg orally x 1.      Yeast infections can be caused by antibiotics and by moisture. Sometimes you can have overgrowth of yeast for no particular reason at all. To help try to prevent yeast infections in the future you should take probiotics  while on antibiotics and remove wet or sweaty clothing immediately.    Follow up with primary care provider if no improvement in 5 days, sooner if worsening.    Vaginal Infection: Yeast (Candidiasis)  Yeast infection occurs when yeast in the vagina increase and start attacking the vaginal tissues. Yeast is a type of fungus. These infections are often caused by a type of yeast called Candida albicans. Other species of yeast can also cause infections. Factors that may make infection more likely include recent antibiotic use, douching, or increased sex. Yeast infections are more common in women who have diabetes, or are obese or pregnant, or have a weak immune system.  Symptoms of yeast infection    Clumpy or thin, white discharge, which may look like cottage cheese    No odor or minimal odor    Severe vaginal itching or burning    Burning with urination    Swelling, redness of vulva    Pain during sex  Treating yeast infection  Yeast infection is treated with a vaginal antifungal cream. In some cases, antifungal pills are prescribed instead. During treatment:    Finish all of your medicine, even if your symptoms go away.    Apply the cream before going to bed. Lie flat after applying so that it doesn't drip out.    Do not douche or use tampons.    Don't rely on a diaphragm or condoms, since the cream may weaken them.    Avoid intercourse if advised by your healthcare provider.     Should I treat a yeast infection myself?  Discuss with your healthcare provider whether you should use over-the-counter medicines to treat a yeast infection. Self-treatment may depend on whether:    You've had a yeast infection in the past.    You're at risk for STDs.  Call your healthcare provider if symptoms do not go away or come back after treatment.   Date Last Reviewed: 5/12/2015 2000-2016 [employer]. [address] [place] [address], [place] [address]. All rights reserved. This information is not intended as a substitute for  professional medical care. Always follow your healthcare professional's instructions.

## 2021-12-22 NOTE — PATIENT INSTRUCTIONS
Please take Diflucan 150mg orally x 1.      Yeast infections can be caused by antibiotics and by moisture. Sometimes you can have overgrowth of yeast for no particular reason at all. To help try to prevent yeast infections in the future you should take probiotics while on antibiotics and remove wet or sweaty clothing immediately.    Follow up with primary care provider if no improvement in 5 days, sooner if worsening.    Vaginal Infection: Yeast (Candidiasis)  Yeast infection occurs when yeast in the vagina increase and start attacking the vaginal tissues. Yeast is a type of fungus. These infections are often caused by a type of yeast called Candida albicans. Other species of yeast can also cause infections. Factors that may make infection more likely include recent antibiotic use, douching, or increased sex. Yeast infections are more common in women who have diabetes, or are obese or pregnant, or have a weak immune system.  Symptoms of yeast infection    Clumpy or thin, white discharge, which may look like cottage cheese    No odor or minimal odor    Severe vaginal itching or burning    Burning with urination    Swelling, redness of vulva    Pain during sex  Treating yeast infection  Yeast infection is treated with a vaginal antifungal cream. In some cases, antifungal pills are prescribed instead. During treatment:    Finish all of your medicine, even if your symptoms go away.    Apply the cream before going to bed. Lie flat after applying so that it doesn't drip out.    Do not douche or use tampons.    Don't rely on a diaphragm or condoms, since the cream may weaken them.    Avoid intercourse if advised by your healthcare provider.     Should I treat a yeast infection myself?  Discuss with your healthcare provider whether you should use over-the-counter medicines to treat a yeast infection. Self-treatment may depend on whether:    You've had a yeast infection in the past.    You're at risk for STDs.  Call your  healthcare provider if symptoms do not go away or come back after treatment.   Date Last Reviewed: 5/12/2015 2000-2016 [employer]. [address] [place] [address], [place] [address]. All rights reserved. This information is not intended as a substitute for professional medical care. Always follow your healthcare professional's instructions.

## 2023-04-01 ENCOUNTER — HEALTH MAINTENANCE LETTER (OUTPATIENT)
Age: 28
End: 2023-04-01

## 2024-06-02 ENCOUNTER — HEALTH MAINTENANCE LETTER (OUTPATIENT)
Age: 29
End: 2024-06-02